# Patient Record
Sex: MALE | Race: WHITE | NOT HISPANIC OR LATINO | Employment: OTHER | URBAN - METROPOLITAN AREA
[De-identification: names, ages, dates, MRNs, and addresses within clinical notes are randomized per-mention and may not be internally consistent; named-entity substitution may affect disease eponyms.]

---

## 2017-08-31 ENCOUNTER — TELEPHONE (OUTPATIENT)
Dept: PAIN MEDICINE | Facility: CLINIC | Age: 67
End: 2017-08-31

## 2017-09-11 ENCOUNTER — HOSPITAL ENCOUNTER (OUTPATIENT)
Dept: GENERAL RADIOLOGY | Facility: HOSPITAL | Age: 67
Discharge: HOME | End: 2017-09-11

## 2017-09-11 ENCOUNTER — TRANSCRIBE ORDERS (OUTPATIENT)
Dept: ADMINISTRATIVE | Facility: HOSPITAL | Age: 67
End: 2017-09-11

## 2017-09-11 ENCOUNTER — HOSPITAL ENCOUNTER (OUTPATIENT)
Dept: CT IMAGING | Facility: HOSPITAL | Age: 67
Discharge: HOME | End: 2017-09-11
Admitting: INTERNAL MEDICINE

## 2017-09-11 DIAGNOSIS — R05.9 COUGH: ICD-10-CM

## 2017-09-11 DIAGNOSIS — R05.9 COUGH: Primary | ICD-10-CM

## 2017-09-11 DIAGNOSIS — K57.92 ACUTE DIVERTICULITIS: ICD-10-CM

## 2017-09-11 DIAGNOSIS — K57.92 ACUTE DIVERTICULITIS: Primary | ICD-10-CM

## 2017-09-11 PROCEDURE — 74176 CT ABD & PELVIS W/O CONTRAST: CPT

## 2017-09-11 PROCEDURE — 71020 HC CHEST PA AND LATERAL: CPT

## 2017-09-21 ENCOUNTER — TRANSCRIBE ORDERS (OUTPATIENT)
Dept: ADMINISTRATIVE | Facility: HOSPITAL | Age: 67
End: 2017-09-21

## 2017-09-21 DIAGNOSIS — K80.00 CALCULUS OF GALLBLADDER WITH ACUTE CHOLECYSTITIS WITHOUT OBSTRUCTION: Primary | ICD-10-CM

## 2017-09-25 ENCOUNTER — HOSPITAL ENCOUNTER (OUTPATIENT)
Dept: ULTRASOUND IMAGING | Facility: HOSPITAL | Age: 67
Discharge: HOME | End: 2017-09-25
Admitting: INTERNAL MEDICINE

## 2017-09-25 DIAGNOSIS — K80.00 CALCULUS OF GALLBLADDER WITH ACUTE CHOLECYSTITIS WITHOUT OBSTRUCTION: ICD-10-CM

## 2017-09-25 PROCEDURE — 76705 ECHO EXAM OF ABDOMEN: CPT

## 2017-11-16 ENCOUNTER — LAB REQUISITION (OUTPATIENT)
Dept: LAB | Facility: HOSPITAL | Age: 67
End: 2017-11-16

## 2017-11-16 DIAGNOSIS — K80.20 CALCULUS OF GALLBLADDER WITHOUT CHOLECYSTITIS WITHOUT OBSTRUCTION: ICD-10-CM

## 2017-11-16 PROCEDURE — 88304 TISSUE EXAM BY PATHOLOGIST: CPT | Performed by: SURGERY

## 2017-11-19 LAB
CYTO UR: NORMAL
DX PRELIMINARY: NORMAL
LAB AP CASE REPORT: NORMAL
LAB AP CLINICAL INFORMATION: NORMAL
Lab: NORMAL
PATH REPORT.GROSS SPEC: NORMAL

## 2018-06-05 ENCOUNTER — TRANSCRIBE ORDERS (OUTPATIENT)
Dept: ADMINISTRATIVE | Facility: HOSPITAL | Age: 68
End: 2018-06-05

## 2018-06-05 DIAGNOSIS — R55 NEAR SYNCOPE: ICD-10-CM

## 2018-06-05 DIAGNOSIS — R07.9 CHEST PAIN, UNSPECIFIED TYPE: Primary | ICD-10-CM

## 2018-06-21 ENCOUNTER — APPOINTMENT (OUTPATIENT)
Dept: CARDIOLOGY | Facility: HOSPITAL | Age: 68
End: 2018-06-21

## 2018-07-02 ENCOUNTER — HOSPITAL ENCOUNTER (OUTPATIENT)
Dept: CARDIOLOGY | Facility: HOSPITAL | Age: 68
Discharge: HOME | End: 2018-07-02

## 2018-07-02 VITALS
HEIGHT: 69 IN | DIASTOLIC BLOOD PRESSURE: 82 MMHG | HEART RATE: 64 BPM | BODY MASS INDEX: 31.7 KG/M2 | SYSTOLIC BLOOD PRESSURE: 126 MMHG | WEIGHT: 214 LBS

## 2018-07-02 DIAGNOSIS — R07.9 CHEST PAIN, UNSPECIFIED TYPE: ICD-10-CM

## 2018-07-02 DIAGNOSIS — R55 NEAR SYNCOPE: ICD-10-CM

## 2018-07-02 LAB
BH CV NUCLEAR PRIOR STUDY: 3
BH CV STRESS BP STAGE 3: NORMAL
BH CV STRESS BP STAGE 4: NORMAL
BH CV STRESS COMMENTS STAGE 1: NORMAL
BH CV STRESS DOSE REGADENOSON STAGE 1: 0.4
BH CV STRESS DURATION MIN STAGE 1: 1
BH CV STRESS DURATION MIN STAGE 2: 1
BH CV STRESS DURATION MIN STAGE 3: 1
BH CV STRESS DURATION MIN STAGE 4: 1
BH CV STRESS DURATION SEC STAGE 2: 0
BH CV STRESS HR STAGE 1: 61
BH CV STRESS HR STAGE 2: 82
BH CV STRESS HR STAGE 3: 91
BH CV STRESS HR STAGE 4: 68
BH CV STRESS PROTOCOL 1: NORMAL
BH CV STRESS RECOVERY BP: NORMAL MMHG
BH CV STRESS RECOVERY HR: 63 BPM
BH CV STRESS STAGE 1: 1
BH CV STRESS STAGE 2: 2
BH CV STRESS STAGE 3: 3
BH CV STRESS STAGE 4: 4
LV EF NUC BP: 59 %
MAXIMAL PREDICTED HEART RATE: 153 BPM
PERCENT MAX PREDICTED HR: 60.13 %
STRESS BASELINE BP: NORMAL MMHG
STRESS BASELINE HR: 56 BPM
STRESS PERCENT HR: 71 %
STRESS POST PEAK BP: NORMAL MMHG
STRESS POST PEAK HR: 92 BPM
STRESS TARGET HR: 130 BPM

## 2018-07-02 PROCEDURE — 93017 CV STRESS TEST TRACING ONLY: CPT

## 2018-07-02 PROCEDURE — 93018 CV STRESS TEST I&R ONLY: CPT | Performed by: INTERNAL MEDICINE

## 2018-07-02 PROCEDURE — A9500 TC99M SESTAMIBI: HCPCS | Performed by: INTERNAL MEDICINE

## 2018-07-02 PROCEDURE — 78452 HT MUSCLE IMAGE SPECT MULT: CPT

## 2018-07-02 PROCEDURE — 0 TECHNETIUM SESTAMIBI: Performed by: INTERNAL MEDICINE

## 2018-07-02 PROCEDURE — 78452 HT MUSCLE IMAGE SPECT MULT: CPT | Performed by: INTERNAL MEDICINE

## 2018-07-02 PROCEDURE — 25010000002 REGADENOSON 0.4 MG/5ML SOLUTION: Performed by: INTERNAL MEDICINE

## 2018-07-02 RX ORDER — SULINDAC 200 MG/1
200 TABLET ORAL 2 TIMES DAILY
COMMUNITY
End: 2019-11-20 | Stop reason: HOSPADM

## 2018-07-02 RX ADMIN — REGADENOSON 0.4 MG: 0.08 INJECTION, SOLUTION INTRAVENOUS at 09:26

## 2018-07-02 RX ADMIN — TECHNETIUM TC 99M SESTAMIBI 1 DOSE: 1 INJECTION INTRAVENOUS at 07:50

## 2018-07-02 RX ADMIN — TECHNETIUM TC 99M SESTAMIBI 1 DOSE: 1 INJECTION INTRAVENOUS at 09:25

## 2019-06-07 RX ORDER — DULOXETIN HYDROCHLORIDE 60 MG/1
60 CAPSULE, DELAYED RELEASE ORAL DAILY
COMMUNITY
End: 2019-11-11

## 2019-06-07 RX ORDER — MONTELUKAST SODIUM 4 MG/1
1 TABLET, CHEWABLE ORAL 2 TIMES DAILY
COMMUNITY
End: 2019-11-11

## 2019-06-10 ENCOUNTER — OFFICE VISIT (OUTPATIENT)
Dept: NEUROSURGERY | Facility: CLINIC | Age: 69
End: 2019-06-10

## 2019-06-10 VITALS
SYSTOLIC BLOOD PRESSURE: 125 MMHG | RESPIRATION RATE: 17 BRPM | WEIGHT: 218.2 LBS | HEIGHT: 69 IN | DIASTOLIC BLOOD PRESSURE: 95 MMHG | BODY MASS INDEX: 32.32 KG/M2

## 2019-06-10 DIAGNOSIS — F32.A DEPRESSION, UNSPECIFIED DEPRESSION TYPE: ICD-10-CM

## 2019-06-10 DIAGNOSIS — Z72.0 TOBACCO ABUSE: ICD-10-CM

## 2019-06-10 DIAGNOSIS — M43.16 SPONDYLOLISTHESIS OF LUMBAR REGION: Primary | ICD-10-CM

## 2019-06-10 DIAGNOSIS — M51.36 DDD (DEGENERATIVE DISC DISEASE), LUMBAR: ICD-10-CM

## 2019-06-10 DIAGNOSIS — Z71.6 TOBACCO ABUSE COUNSELING: ICD-10-CM

## 2019-06-10 PROCEDURE — 99204 OFFICE O/P NEW MOD 45 MIN: CPT | Performed by: NEUROLOGICAL SURGERY

## 2019-06-10 NOTE — PATIENT INSTRUCTIONS
-    Steps to Quit Smoking  Smoking tobacco can be harmful to your health and can affect almost every organ in your body. Smoking puts you, and those around you, at risk for developing many serious chronic diseases. Quitting smoking is difficult, but it is one of the best things that you can do for your health. It is never too late to quit.  What are the benefits of quitting smoking?  When you quit smoking, you lower your risk of developing serious diseases and conditions, such as:  · Lung cancer or lung disease, such as COPD.  · Heart disease.  · Stroke.  · Heart attack.  · Infertility.  · Osteoporosis and bone fractures.    Additionally, symptoms such as coughing, wheezing, and shortness of breath may get better when you quit. You may also find that you get sick less often because your body is stronger at fighting off colds and infections. If you are pregnant, quitting smoking can help to reduce your chances of having a baby of low birth weight.  How do I get ready to quit?  When you decide to quit smoking, create a plan to make sure that you are successful. Before you quit:  · Pick a date to quit. Set a date within the next two weeks to give you time to prepare.  · Write down the reasons why you are quitting. Keep this list in places where you will see it often, such as on your bathroom mirror or in your car or wallet.  · Identify the people, places, things, and activities that make you want to smoke (triggers) and avoid them. Make sure to take these actions:  ? Throw away all cigarettes at home, at work, and in your car.  ? Throw away smoking accessories, such as ashtrays and lighters.  ? Clean your car and make sure to empty the ashtray.  ? Clean your home, including curtains and carpets.  · Tell your family, friends, and coworkers that you are quitting. Support from your loved ones can make quitting easier.  · Talk with your health care provider about your options for quitting smoking.  · Find out what  treatment options are covered by your health insurance.    What strategies can I use to quit smoking?  Talk with your healthcare provider about different strategies to quit smoking. Some strategies include:  · Quitting smoking altogether instead of gradually lessening how much you smoke over a period of time. Research shows that quitting “cold turkey” is more successful than gradually quitting.  · Attending in-person counseling to help you build problem-solving skills. You are more likely to have success in quitting if you attend several counseling sessions. Even short sessions of 10 minutes can be effective.  · Finding resources and support systems that can help you to quit smoking and remain smoke-free after you quit. These resources are most helpful when you use them often. They can include:  ? Online chats with a counselor.  ? Telephone quitlines.  ? Printed self-help materials.  ? Support groups or group counseling.  ? Text messaging programs.  ? Mobile phone applications.  · Taking medicines to help you quit smoking. (If you are pregnant or breastfeeding, talk with your health care provider first.) Some medicines contain nicotine and some do not. Both types of medicines help with cravings, but the medicines that include nicotine help to relieve withdrawal symptoms. Your health care provider may recommend:  ? Nicotine patches, gum, or lozenges.  ? Nicotine inhalers or sprays.  ? Non-nicotine medicine that is taken by mouth.    Talk with your health care provider about combining strategies, such as taking medicines while you are also receiving in-person counseling. Using these two strategies together makes you more likely to succeed in quitting than if you used either strategy on its own.  If you are pregnant or breastfeeding, talk with your health care provider about finding counseling or other support strategies to quit smoking. Do not take medicine to help you quit smoking unless told to do so by your health  care provider.  What things can I do to make it easier to quit?  Quitting smoking might feel overwhelming at first, but there is a lot that you can do to make it easier. Take these important actions:  · Reach out to your family and friends and ask that they support and encourage you during this time. Call telephone quitlines, reach out to support groups, or work with a counselor for support.  · Ask people who smoke to avoid smoking around you.  · Avoid places that trigger you to smoke, such as bars, parties, or smoke-break areas at work.  · Spend time around people who do not smoke.  · Lessen stress in your life, because stress can be a smoking trigger for some people. To lessen stress, try:  ? Exercising regularly.  ? Deep-breathing exercises.  ? Yoga.  ? Meditating.  ? Performing a body scan. This involves closing your eyes, scanning your body from head to toe, and noticing which parts of your body are particularly tense. Purposefully relax the muscles in those areas.  · Download or purchase mobile phone or tablet apps (applications) that can help you stick to your quit plan by providing reminders, tips, and encouragement. There are many free apps, such as QuitGuide from the CDC (Centers for Disease Control and Prevention). You can find other support for quitting smoking (smoking cessation) through smokefree.gov and other websites.    How will I feel when I quit smoking?  Within the first 24 hours of quitting smoking, you may start to feel some withdrawal symptoms. These symptoms are usually most noticeable 2-3 days after quitting, but they usually do not last beyond 2-3 weeks. Changes or symptoms that you might experience include:  · Mood swings.  · Restlessness, anxiety, or irritation.  · Difficulty concentrating.  · Dizziness.  · Strong cravings for sugary foods in addition to nicotine.  · Mild weight gain.  · Constipation.  · Nausea.  · Coughing or a sore throat.  · Changes in how your medicines work in your  body.  · A depressed mood.  · Difficulty sleeping (insomnia).    After the first 2-3 weeks of quitting, you may start to notice more positive results, such as:  · Improved sense of smell and taste.  · Decreased coughing and sore throat.  · Slower heart rate.  · Lower blood pressure.  · Clearer skin.  · The ability to breathe more easily.  · Fewer sick days.    Quitting smoking is very challenging for most people. Do not get discouraged if you are not successful the first time. Some people need to make many attempts to quit before they achieve long-term success. Do your best to stick to your quit plan, and talk with your health care provider if you have any questions or concerns.  This information is not intended to replace advice given to you by your health care provider. Make sure you discuss any questions you have with your health care provider.  Document Released: 12/12/2002 Document Revised: 07/24/2018 Document Reviewed: 05/03/2016  [employer] Interactive Patient Education © 2019 [employer]              Spinal Fusion, Adult  Spinal fusion is a procedure to make two or more of the bones in the spinal column (vertebrae) grow together (fuse). This procedure stops the vertebrae from moving and rubbing against each other. The goal of this procedure is to relieve pain and prevent deformity and weakening of the spine.  During a spinal fusion procedure, bone material (graft) is put in between the two vertebrae to help them fuse. Hardware such as rods, screws, metal plates, or cages can be inserted to stabilize the vertebrae while they heal.  This procedure is used to treat conditions, including:  · Spinal injury.  · Herniated disk.  · Abnormal curvatures of the spine, such as scoliosis or kyphosis.  · Infections or tumors in the spine.  · Spondylolisthesis. This is when one vertebra slips on top of another.  · Spinal stenosis. This is a narrowing of the spine.    Tell a health care provider about:  · Any allergies you  have.  · All medicines you are taking, including vitamins, herbs, eye drops, creams, and over-the-counter medicines.  · Any problems you or family members have had with anesthetic medicines.  · Any blood disorders you have.  · Any surgeries you have had.  · Any medical conditions you have.  · Whether you are pregnant or may be pregnant.  What are the risks?  Generally, this is a safe procedure. However, problems may occur, including:  · Infection.  · Bleeding.  · Allergic reactions to medicines or dyes.  · Damage to other structures or organs, such as nerves near the spine.  · Spinal fluid leakage.  · Blood clots.  · Trouble controlling urination or bowel movements.  · Pseudoarthrosis. This is when the vertebrae do not fuse together completely.    What happens before the procedure?  Staying hydrated  Follow instructions from your health care provider about hydration, which may include:  · Up to 2 hours before the procedure - you may continue to drink clear liquids, such as water, clear fruit juice, black coffee, and plain tea.    Eating and drinking restrictions  Follow instructions from your health care provider about eating and drinking, which may include:  · 8 hours before the procedure - stop eating heavy meals or foods such as meat, fried foods, or fatty foods.  · 6 hours before the procedure - stop eating light meals or foods, such as toast or cereal.  · 6 hours before the procedure - stop drinking milk or drinks that contain milk.  · 2 hours before the procedure - stop drinking clear liquids.    Medicines  · Ask your health care provider about:  ? Changing or stopping your regular medicines. This is especially important if you are taking diabetes medicines or blood thinners.  ? Taking medicines such as aspirin and ibuprofen. These medicines can thin your blood. Do not take these medicines before your procedure if your health care provider instructs you not to.  · You may be given antibiotic medicine to help  prevent infection.  General instructions  · Ask your health care provider how your surgical site will be marked or identified.  · You will have blood and urine samples taken.  · You may have imaging tests, such as:  ? X-rays.  ? CT scan.  ? MRI.  · Plan to have someone take you home from the hospital or clinic.  · If you will be going home right after the procedure, plan to have someone with you for 24 hours.  · Do not use any tobacco products, including cigarettes, chewing tobacco, or electronic cigarettes. If you need help quitting, ask your health care provider. Tobacco and nicotine products can delay healing after your surgery.  What happens during the procedure?  · To reduce your risk of infection:  ? Your health care team will wash or sanitize their hands.  ? Your skin will be washed with soap.  ? Hair may be removed from the surgical area.  · An IV tube will be inserted into one of your veins.  · You will be given one or more of the following:  ? A medicine to help you relax (sedative).  ? A medicine to make you fall asleep (general anesthetic).  · If bone from another part of your body (autogenous bone) is being used to fill the space between your vertebrae:  ? An incision will be made over the site of the bone graft. Often, the bone is taken from the hip (pelvic) bone.  ? A small part of the bone will be removed.  · An incision will be made over the vertebrae that will be fused. This incision may be on your back, abdomen, or side.  · The muscles will be moved aside so the surgeon can see the vertebrae.  · If you are having this procedure to treat a herniated disk, part of the disk will be removed.  · The space between the vertebrae will be filled with autogenous bone, bone from a bone donor (allograft bone), or artificial bone material.  · Screws and rods or metal plates may be used to stabilize the vertebrae while they fuse.  · The muscles will be moved back into place.  · A small tube (drain) may be  placed near one of the incisions to help drain extra fluid from your surgical site.  · Your incision(s) will be closed.  · A bandage (dressing) may be used to cover your incision(s).  The procedure may vary among health care providers and hospitals.  What happens after the procedure?  · You will be given medicine as needed for pain.  · You will continue to receive fluids and medicines through an IV tube.  · Your blood pressure, heart rate, breathing rate, and blood oxygen level will be monitored until the medicines you were given have worn off.  · You may be given a brace to wear while you heal.  · You may have to wear compression stockings. These stockings help to prevent blood clots and reduce swelling in your legs.  · You will be taught how to move correctly and how to stand and walk. While in bed, you will be instructed to turn frequently without twisting the back (log rolling technique).  · Do not drive for 24 hours if you received a sedative.  Summary  · Spinal fusion is a procedure to make two or more of the bones in the spinal column (vertebrae) grow together (fuse).  · Follow instructions from your healthcare provider about what to eat, what to drink, and what medicines to take.  · After surgery, you will be instructed to turn frequently without twisting the back (log rolling technique) while in bed. You will also be taught how to move correctly and how to stand and walk.  This information is not intended to replace advice given to you by your health care provider. Make sure you discuss any questions you have with your health care provider.  Document Released: 09/15/2004 Document Revised: 12/05/2017 Document Reviewed: 12/05/2017  [employer] Interactive Patient Education © 2019 [employer]

## 2019-06-10 NOTE — PROGRESS NOTES
NAME: NESTOR GAXIOLA   DOS: 6/10/2019  : 1950  PCP: Gallito Root MD    Chief Complaint:    Chief Complaint   Patient presents with   • Low back & bilateral leg pain-L side greater       History of Present Illness:  68 y.o. 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  He is about to retire.    He presents with neurogenic claudication he has back buttock hip and leg pain he denies any cauda equina syndrome he has not been through any early therapies for it and is here to discuss surgical options for repair he denies any other symptomatologies  PMHX  Allergies:  No Known Allergies  Medications    Current Outpatient Medications:   •  CANNABIDIOL PO, Take  by mouth., Disp: , Rfl:   •  colestipol (COLESTID) 1 g tablet, Take 1 g by mouth 2 (Two) Times a Day., Disp: , Rfl:   •  diclofenac (VOLTAREN) 1 % gel gel, Apply 4 g topically to the appropriate area as directed 4 (Four) Times a Day As Needed., Disp: , Rfl:   •  DULoxetine (CYMBALTA) 60 MG capsule, Take 60 mg by mouth Daily., Disp: , Rfl:   •  oxyCODONE-acetaminophen (PERCOCET) 5-325 MG per tablet, Take 1-2 tablets by mouth Every 4 (Four) Hours As Needed for pain., Disp: 30 tablet, Rfl: 0  •  promethazine (PHENERGAN) 25 MG tablet, Take 1 tablet by mouth Every 6 (Six) Hours As Needed for nausea., Disp: 10 tablet, Rfl: 0  •  sulindac (CLINORIL) 200 MG tablet, Take 200 mg by mouth 2 (Two) Times a Day., Disp: , Rfl:   Past Medical History:  Past Medical History:   Diagnosis Date   • Cancer (CMS/HCC)     Skin     Past Surgical History:  Past Surgical History:   Procedure Laterality Date   • KNEE ARTHROPLASTY, PARTIAL REPLACEMENT Bilateral      Social Hx:  Social History     Tobacco Use   • Smoking status: Current Every Day Smoker   • Smokeless  tobacco: Never Used   Substance Use Topics   • Alcohol use: Yes   • Drug use: Yes     Types: Marijuana     Family Hx:  History reviewed. No pertinent family history.  Review of Systems:        Review of Systems   Constitutional: Negative for activity change, appetite change, chills, diaphoresis, fatigue, fever and unexpected weight change.   HENT: Negative for congestion, dental problem, drooling, ear discharge, ear pain, facial swelling, hearing loss, mouth sores, nosebleeds, postnasal drip, rhinorrhea, sinus pressure, sneezing, sore throat, tinnitus, trouble swallowing and voice change.    Eyes: Positive for redness. Negative for photophobia, pain, discharge, itching and visual disturbance.   Respiratory: Negative for apnea, cough, choking, chest tightness, shortness of breath, wheezing and stridor.    Cardiovascular: Negative for chest pain, palpitations and leg swelling.   Gastrointestinal: Positive for anal bleeding. Negative for abdominal distention, abdominal pain, blood in stool, constipation, diarrhea, nausea, rectal pain and vomiting.   Endocrine: Negative for cold intolerance, heat intolerance, polydipsia, polyphagia and polyuria.   Genitourinary: Negative for decreased urine volume, difficulty urinating, dysuria, enuresis, flank pain, frequency, genital sores, hematuria and urgency.   Musculoskeletal: Positive for arthralgias, back pain, myalgias, neck pain and neck stiffness. Negative for gait problem and joint swelling.   Skin: Negative for color change, pallor, rash and wound.   Allergic/Immunologic: Negative for environmental allergies, food allergies and immunocompromised state.   Neurological: Negative for dizziness, tremors, seizures, syncope, facial asymmetry, speech difficulty, weakness, light-headedness, numbness and headaches.   Hematological: Negative for adenopathy. Does not bruise/bleed easily.   Psychiatric/Behavioral: Negative for agitation, behavioral problems, confusion, decreased  concentration, dysphoric mood, hallucinations, self-injury, sleep disturbance and suicidal ideas. The patient is not nervous/anxious and is not hyperactive.    All other systems reviewed and are negative.     I have reviewed this note template and all pertinent parts of the review of systems social, family history, surgical history and medication list      Physical Examination:  Vitals:    06/10/19 0927   BP: 125/95   Resp: 17      General Appearance:   Well developed, well nourished, well groomed, alert, and cooperative.  Neurological examination:  Neurologic Exam  Vital signs were reviewed and documented in the chart  Patient appeared in good neurologic function with normal comprehension fluent speech  Mood and affect are normal  Sense of smell deferred    Pupils symmetric equally reactive funduscopic exam not visualized   Visual fields intact to confrontation  Extraocular movements intact  Face motor function is symmetric  Facial sensations normal  Hearing intact to finger rub hearing intact to finger rub  Tongue is midline  Palate symmetric  Swallowing normal  Shoulder shrug normal  He has a smoker's cough  Muscle bulk and tone normal  5 out of 5 strength no motor drift  Gait normal intact  Negative Romberg  No clonus long tract signs or myelopathy    Reflexes symmetric  No edema noted and extremities skin appears normal    Straight leg raise sign absent  No signs of intrinsic hip dysfunction he does have decreased range of motion  Back is without any lesions or abnormality  Feet are warm and well perfused        Review of Imaging/DATA:  I reviewed his MRI is got a grade 1 spondylolisthesis at L4 on 5  Diagnoses/Plan:    Mr. Pablo is a 68 y.o. male     I had about a 45-minute discussion with this gentleman.  Very interesting.  I explained to him the fusion the options the treatment for the neurogenic claudication as well as try to set appropriate expectations regarding outcome from surgery.  When I broach  the subject of smoking cessation we had a long discussion about this and he stated that he was not ready to to stop smoking.  I explained to him that the detrimental effects on outcomes regarding back surgery and fusion and specifics are not low and he unpacked a lot of his social stressors that caused him to continue to smoke.  I made a deal with him that I think it is not unreasonable to consider surgery in a smoker as long as he gets medical clearance I like him to see a pulmonologist to check his lung function but more importantly I think he needs to talk to somebody about his social stressors that are causing him to maintain this unhealthy activity.  He related to me in addiction history that included cocaine use in the past as well as relational issues currently.    I think surgery will likely help him as long as it placed in the proper context.  He may be a candidate for an L4-5 lumbar interbody fusion I explained the risk benefits and options to him.  He is can see me back

## 2019-06-12 ENCOUNTER — TELEPHONE (OUTPATIENT)
Dept: NEUROSURGERY | Facility: CLINIC | Age: 69
End: 2019-06-12

## 2019-06-12 NOTE — TELEPHONE ENCOUNTER
Pt called in today to say that he was unhappy with the visit he had with Delonte with this week and would like to request a 2nd opinion with Dr. Santa.  Please let me know if it is ok with the providers to make this change.

## 2019-07-02 ENCOUNTER — OFFICE VISIT (OUTPATIENT)
Dept: NEUROSURGERY | Facility: CLINIC | Age: 69
End: 2019-07-02

## 2019-07-02 VITALS — WEIGHT: 218 LBS | TEMPERATURE: 97.2 F | BODY MASS INDEX: 32.29 KG/M2 | HEIGHT: 69 IN

## 2019-07-02 DIAGNOSIS — M47.816 FACET ARTHROPATHY, LUMBAR: ICD-10-CM

## 2019-07-02 DIAGNOSIS — M43.16 SPONDYLOLISTHESIS OF LUMBAR REGION: ICD-10-CM

## 2019-07-02 DIAGNOSIS — M51.36 DDD (DEGENERATIVE DISC DISEASE), LUMBAR: ICD-10-CM

## 2019-07-02 DIAGNOSIS — M48.062 SPINAL STENOSIS, LUMBAR REGION, WITH NEUROGENIC CLAUDICATION: Primary | ICD-10-CM

## 2019-07-02 PROCEDURE — 99213 OFFICE O/P EST LOW 20 MIN: CPT | Performed by: NEUROLOGICAL SURGERY

## 2019-07-02 NOTE — PROGRESS NOTES
Patient: Johnnie Pablo  : 1950    Primary Care Provider: Gallito Root MD    Requesting Provider: As above        History    Chief Complaint: Bilateral hip pain with numbness and burning in the buttocks and legs.    History of Present Illness: Mr. 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  He has had chronic back difficulties that have been treated over the years with injections.  His symptoms have been worse over the last year and less responsive to injections.  Remotely he did therapy.  Sometimes the pain comes and goes but it is worse with walking.  He has mild low back pain.  He complains of a baseline numbness in his feet that he attributes to neuropathy.  He takes an occasional half of a Percocet to be able to play a round of golf but then he pays for it pain-wise later.  He has no bowel or bladder dysfunction.  He has recently seen Dr. Martel who discussed surgical intervention and smoking cessation.    Review of Systems   Constitutional: Negative for activity change, appetite change, chills, diaphoresis, fatigue, fever and unexpected weight change.   HENT: Negative for congestion, dental problem, drooling, ear discharge, ear pain, facial swelling, hearing loss, mouth sores, nosebleeds, postnasal drip, rhinorrhea, sinus pressure, sneezing, sore throat, tinnitus, trouble swallowing and voice change.    Eyes: Positive for redness. Negative for photophobia, pain, discharge, itching and visual disturbance.   Respiratory: Negative for apnea, cough, choking, chest tightness, shortness of breath, wheezing and stridor.    Cardiovascular: Negative for chest pain, palpitations and leg swelling.   Gastrointestinal: Positive for anal bleeding. Negative for abdominal distention, abdominal pain, blood in stool, constipation, diarrhea, nausea, rectal pain and vomiting.   Endocrine: Negative for cold intolerance, heat intolerance,  "polydipsia, polyphagia and polyuria.   Genitourinary: Negative for decreased urine volume, difficulty urinating, dysuria, enuresis, flank pain, frequency, genital sores, hematuria and urgency.   Musculoskeletal: Positive for arthralgias, back pain, myalgias, neck pain and neck stiffness. Negative for gait problem and joint swelling.   Skin: Negative for color change, pallor, rash and wound.   Allergic/Immunologic: Negative for environmental allergies, food allergies and immunocompromised state.   Neurological: Negative for dizziness, tremors, seizures, syncope, facial asymmetry, speech difficulty, weakness, light-headedness, numbness and headaches.   Hematological: Negative for adenopathy. Does not bruise/bleed easily.   Psychiatric/Behavioral: Negative for agitation, behavioral problems, confusion, decreased concentration, dysphoric mood, hallucinations, self-injury, sleep disturbance and suicidal ideas. The patient is not nervous/anxious and is not hyperactive.    All other systems reviewed and are negative.      The patient's past medical history, past surgical history, family history, and social history have been reviewed at length in the electronic medical record.    Physical Exam:   Temp 97.2 °F (36.2 °C)   Ht 175 cm (68.9\")   Wt 98.9 kg (218 lb)   BMI 32.29 kg/m²   MUSCULOSKELETAL:  Straight leg raising is negative.  Rosales's Sign is negative.  ROM in back normal.  Tenderness in the back to palpation is not observed.  NEUROLOGICAL:  Strength is intact in the lower extremities to direct testing.  Muscle tone is normal throughout.  Station and gait are normal.  Sensation is intact to light touch testing throughout except in the feet where it is diminished bilaterally.  Deep tendon reflexes are difficult to elicit throughout.  Coordination is intact.      Medical Decision Making    Data Review:   MRI of the lumbar spine dated 5/6/2019 demonstrates some diffuse degenerative disc disease.  There is a generous " grade 1 listhesis of L4 on L5 with a fair amount of spinal stenosis, right greater than left.  There is a right-sided joint effusion.    Diagnosis:   1.  L4-5 spondylolisthesis with probable instability.  2.  Lumbar stenosis with neurogenic claudication.  3.  Lumbar degenerative disc disease.  4.  Tobacco abuse.    Treatment Options:   Definitive treatment would entail L4-5 decompression is fusion as noted by Dr. Martel.  I too harbor significant reservations given his heavy tobacco use.  If he could cut down substantially then I think it would be reasonable to pursue surgery.  After lengthy discussion he is going to work on cutting down his smoking.  He is thinking that he would like to do surgery in October or November.  As such I will see him back in September and if he has been successful in reducing his smoking then we will set up the surgical intervention.  At length I have discussed the nature of the surgery as well as the potential risks, complications, and limitations of the procedure.       Diagnosis Plan   1. Spinal stenosis, lumbar region, with neurogenic claudication     2. Spondylolisthesis of lumbar region     3. DDD (degenerative disc disease), lumbar     4. Facet arthropathy, lumbar             I, Dr. Santa, personally performed the services described in the documentation, as scribed in my presence, and it is both accurate and complete.  Scribed for Farhat Santa MD by Manoj Jara CMA on 07/02/2019 at 8:53 AM

## 2019-09-26 ENCOUNTER — TELEPHONE (OUTPATIENT)
Dept: NEUROSURGERY | Facility: CLINIC | Age: 69
End: 2019-09-26

## 2019-09-26 NOTE — TELEPHONE ENCOUNTER
He needs to substantially cut back on his smoking prior to being considered for surgery. He can look elsewhere if can't stop smoking

## 2019-10-03 ENCOUNTER — TELEPHONE (OUTPATIENT)
Dept: NEUROSURGERY | Facility: CLINIC | Age: 69
End: 2019-10-03

## 2019-10-03 NOTE — TELEPHONE ENCOUNTER
Provider:  Kiet  Caller: patient  Time of call:   10:22  Phone #:  [phone]  Surgery:    Surgery Date:    Last visit:   07/02/19  Next visit: None    CHLOE:         Reason for call:     Patient wants Dr. Santa to know that he is down to 1/2 pack of cig's qd.  He has been vaping.  He states that he is ready to have his surgery scheduled.

## 2019-10-03 NOTE — TELEPHONE ENCOUNTER
I will discuss this with Dr. Santa when he returns from his trip.  If he agrees, then we can move forward with surgery

## 2019-10-11 NOTE — TELEPHONE ENCOUNTER
I spoke with Dr. Santa regarding surgery.   - he would like to see him back in the office to discuss smoking cessation and other surgical details   - of note, he may consider using BMP.

## 2019-10-23 ENCOUNTER — PREP FOR SURGERY (OUTPATIENT)
Dept: OTHER | Facility: HOSPITAL | Age: 69
End: 2019-10-23

## 2019-10-23 ENCOUNTER — OFFICE VISIT (OUTPATIENT)
Dept: NEUROSURGERY | Facility: CLINIC | Age: 69
End: 2019-10-23

## 2019-10-23 VITALS — BODY MASS INDEX: 32.08 KG/M2 | RESPIRATION RATE: 16 BRPM | WEIGHT: 216.6 LBS | HEIGHT: 69 IN

## 2019-10-23 DIAGNOSIS — M43.16 SPONDYLOLISTHESIS OF LUMBAR REGION: ICD-10-CM

## 2019-10-23 DIAGNOSIS — M51.36 DDD (DEGENERATIVE DISC DISEASE), LUMBAR: ICD-10-CM

## 2019-10-23 DIAGNOSIS — Z72.0 TOBACCO ABUSE: ICD-10-CM

## 2019-10-23 DIAGNOSIS — M48.062 LUMBAR STENOSIS WITH NEUROGENIC CLAUDICATION: Primary | ICD-10-CM

## 2019-10-23 DIAGNOSIS — M48.062 SPINAL STENOSIS, LUMBAR REGION, WITH NEUROGENIC CLAUDICATION: Primary | ICD-10-CM

## 2019-10-23 PROCEDURE — 99213 OFFICE O/P EST LOW 20 MIN: CPT | Performed by: NEUROLOGICAL SURGERY

## 2019-10-23 RX ORDER — ACETAMINOPHEN 500 MG
1000 TABLET ORAL ONCE
Status: CANCELLED | OUTPATIENT
Start: 2019-10-23 | End: 2019-10-23

## 2019-10-23 RX ORDER — CEFAZOLIN SODIUM 2 G/100ML
2 INJECTION, SOLUTION INTRAVENOUS ONCE
Status: CANCELLED | OUTPATIENT
Start: 2019-10-23 | End: 2019-10-23

## 2019-10-23 RX ORDER — OXYCODONE HCL 10 MG/1
10 TABLET, FILM COATED, EXTENDED RELEASE ORAL ONCE
Status: CANCELLED | OUTPATIENT
Start: 2019-10-23 | End: 2019-10-23

## 2019-10-23 RX ORDER — FAMOTIDINE 20 MG/1
20 TABLET, FILM COATED ORAL
Status: CANCELLED | OUTPATIENT
Start: 2019-10-23

## 2019-10-23 RX ORDER — IBUPROFEN 800 MG/1
800 TABLET ORAL ONCE
Status: CANCELLED | OUTPATIENT
Start: 2019-10-23 | End: 2019-10-23

## 2019-10-23 NOTE — PROGRESS NOTES
Patient: Johnnie Pablo  : 1950    Primary Care Provider: Glalito Root MD    Requesting Provider: As above        History    Chief Complaint: Low back and bilateral hip pain with numbness and burning in the buttocks and legs.    History of Present Illness: Mr. Pablo is a 68-year-old gentleman who works in [employer]  He has had chronic back difficulties that have been treated over the years with injections.  His symptoms have been worse over the last year and less responsive to injections.  Remotely he did therapy.  Sometimes the pain comes and goes but it is worse with walking.  He has mild low back pain.  He complains of a baseline numbness in his feet that he attributes to neuropathy.  He takes an occasional half of a Percocet to be able to play a round of golf but then he pays for it pain-wise later.  He has no bowel or bladder dysfunction.  He has recently seen Dr. Martel who discussed surgical intervention and smoking cessation.  I also discussed surgical intervention with him but also required smoking cessation.  He recently had an injection in his back by Dr. Aguero and his symptoms have subsided a bit but are slowly returning.  He has cut his smoking down to about 2 cigarettes a day or so.  He is now vaping.    Review of Systems   Constitutional: Negative for activity change, appetite change, chills, diaphoresis, fatigue, fever and unexpected weight change.   HENT: Negative for congestion, dental problem, drooling, ear discharge, ear pain, facial swelling, hearing loss, mouth sores, nosebleeds, postnasal drip, rhinorrhea, sinus pressure, sneezing, sore throat, tinnitus, trouble swallowing and voice change.    Eyes: Negative for photophobia, pain, discharge, redness, itching and visual disturbance.   Respiratory: Negative for apnea, cough, choking, chest tightness, shortness of breath, wheezing and stridor.    Cardiovascular: Negative for chest pain, palpitations and leg  "swelling.   Gastrointestinal: Negative for abdominal distention, abdominal pain, anal bleeding, blood in stool, constipation, diarrhea, nausea, rectal pain and vomiting.   Endocrine: Negative for cold intolerance, heat intolerance, polydipsia, polyphagia and polyuria.   Genitourinary: Negative for decreased urine volume, difficulty urinating, dysuria, enuresis, flank pain, frequency, genital sores, hematuria and urgency.   Musculoskeletal: Negative for arthralgias, back pain, gait problem, joint swelling, myalgias, neck pain and neck stiffness.   Skin: Negative for color change, pallor, rash and wound.   Allergic/Immunologic: Negative for environmental allergies, food allergies and immunocompromised state.   Neurological: Negative for dizziness, tremors, seizures, syncope, facial asymmetry, speech difficulty, weakness, light-headedness, numbness and headaches.   Hematological: Negative for adenopathy. Does not bruise/bleed easily.   Psychiatric/Behavioral: Negative for agitation, behavioral problems, confusion, decreased concentration, dysphoric mood, hallucinations, self-injury, sleep disturbance and suicidal ideas. The patient is not nervous/anxious and is not hyperactive.    All other systems reviewed and are negative.      The patient's past medical history, past surgical history, family history, and social history have been reviewed at length in the electronic medical record.    Physical Exam:   Resp 16   Ht 175 cm (68.9\")   Wt 98.2 kg (216 lb 9.6 oz)   BMI 32.08 kg/m²   MUSCULOSKELETAL:  Straight leg raising is negative.  Rosales's Sign is negative.  ROM in back normal.  Tenderness in the back to palpation is not observed.  NEUROLOGICAL:  Strength is intact in the lower extremities to direct testing.  Muscle tone is normal throughout.  Station and gait are normal.  Sensation is intact to light touch testing throughout.      Medical Decision Making    Data Review:   MRI of the lumbar spine dated 5/6/2019 " demonstrates some diffuse degenerative disc disease.  There is a generous grade 1 listhesis of L4 on L5 with a fair amount of spinal stenosis, right greater than left.  There is a right-sided joint effusion.    Diagnosis:   1.  Lumbar stenosis with neurogenic claudication.  2.  L4-5 spondylolisthesis with instability.  3.  Lumbar degenerative disc disease.  4.  Tobacco abuse, dramatically improved.    Treatment Options:   I have recommended L4-5 decompression, fusion, stabilization.  I think this will help significantly with his symptoms but it is unlikely to completely eradicate them given the degenerative change in his back.  Some of his pain is certainly mechanical in nature.  The nature of the procedure as well as the potential risks, complications, limitations, and alternatives to the procedure were discussed at length with the patient and the patient has agreed to proceed with surgery.  If possible I would like to utilize BMP to facilitate fusion.  I explained to him the rationale for doing so and that this would be an off label use.       Diagnosis Plan   1. Spinal stenosis, lumbar region, with neurogenic claudication     2. Spondylolisthesis of lumbar region     3. DDD (degenerative disc disease), lumbar     4. Tobacco abuse         Scribed for Farhat Santa MD by Angy Orellana CMA on 10/23/2019 12:20 PM       I, Dr. Santa, personally performed the services described in the documentation, as scribed in my presence, and it is both accurate and complete.

## 2019-10-23 NOTE — H&P
Patient: Johnnie Pablo  : 1950     Primary Care Provider: Gallito Root MD     Requesting Provider: As above           History     Chief Complaint: Low back and bilateral hip pain with numbness and burning in the buttocks and legs.     History of Present Illness: Mr. Pablo is a 68-year-old gentleman who works in [employer]  He has had chronic back difficulties that have been treated over the years with injections.  His symptoms have been worse over the last year and less responsive to injections.  Remotely he did therapy.  Sometimes the pain comes and goes but it is worse with walking.  He has mild low back pain.  He complains of a baseline numbness in his feet that he attributes to neuropathy.  He takes an occasional half of a Percocet to be able to play a round of golf but then he pays for it pain-wise later.  He has no bowel or bladder dysfunction.  He has recently seen Dr. Martel who discussed surgical intervention and smoking cessation.  I also discussed surgical intervention with him but also required smoking cessation.  He recently had an injection in his back by Dr. Aguero and his symptoms have subsided a bit but are slowly returning.  He has cut his smoking down to about 2 cigarettes a day or so.  He is now vaping.     Review of Systems   Constitutional: Negative for activity change, appetite change, chills, diaphoresis, fatigue, fever and unexpected weight change.   HENT: Negative for congestion, dental problem, drooling, ear discharge, ear pain, facial swelling, hearing loss, mouth sores, nosebleeds, postnasal drip, rhinorrhea, sinus pressure, sneezing, sore throat, tinnitus, trouble swallowing and voice change.    Eyes: Negative for photophobia, pain, discharge, redness, itching and visual disturbance.   Respiratory: Negative for apnea, cough, choking, chest tightness, shortness of breath, wheezing and stridor.    Cardiovascular: Negative for chest pain, palpitations and leg  swelling.   Gastrointestinal: Negative for abdominal distention, abdominal pain, anal bleeding, blood in stool, constipation, diarrhea, nausea, rectal pain and vomiting.   Endocrine: Negative for cold intolerance, heat intolerance, polydipsia, polyphagia and polyuria.   Genitourinary: Negative for decreased urine volume, difficulty urinating, dysuria, enuresis, flank pain, frequency, genital sores, hematuria and urgency.   Musculoskeletal: Negative for arthralgias, back pain, gait problem, joint swelling, myalgias, neck pain and neck stiffness.   Skin: Negative for color change, pallor, rash and wound.   Allergic/Immunologic: Negative for environmental allergies, food allergies and immunocompromised state.   Neurological: Negative for dizziness, tremors, seizures, syncope, facial asymmetry, speech difficulty, weakness, light-headedness, numbness and headaches.   Hematological: Negative for adenopathy. Does not bruise/bleed easily.   Psychiatric/Behavioral: Negative for agitation, behavioral problems, confusion, decreased concentration, dysphoric mood, hallucinations, self-injury, sleep disturbance and suicidal ideas. The patient is not nervous/anxious and is not hyperactive.    All other systems reviewed and are negative.        The patient's past medical history, past surgical history, family history, and social history have been reviewed at length in the electronic medical record.     Past Medical History:   Diagnosis Date   • Cancer (CMS/HCC)     Skin     Past Surgical History:   Procedure Laterality Date   • KNEE ARTHROPLASTY, PARTIAL REPLACEMENT Bilateral      No family history on file.  Social History     Socioeconomic History   • Marital status:      Spouse name: Not on file   • Number of children: Not on file   • Years of education: Not on file   • Highest education level: Not on file   Tobacco Use   • Smoking status: Current Every Day Smoker     Packs/day: 0.50     Types: Cigarettes, Electronic  "Cigarette   • Smokeless tobacco: Never Used   Substance and Sexual Activity   • Alcohol use: Yes   • Drug use: Yes     Types: Marijuana   • Sexual activity: Defer     No Known Allergies    Physical Exam:   Resp 16   Ht 175 cm (68.9\")   Wt 98.2 kg (216 lb 9.6 oz)   BMI 32.08 kg/m²   MUSCULOSKELETAL:  Straight leg raising is negative.  Rosales's Sign is negative.  ROM in back normal.  Tenderness in the back to palpation is not observed.  NEUROLOGICAL:  Strength is intact in the lower extremities to direct testing.  Muscle tone is normal throughout.  Station and gait are normal.  Sensation is intact to light touch testing throughout.        Medical Decision Making     Data Review:   MRI of the lumbar spine dated 5/6/2019 demonstrates some diffuse degenerative disc disease.  There is a generous grade 1 listhesis of L4 on L5 with a fair amount of spinal stenosis, right greater than left.  There is a right-sided joint effusion.     Diagnosis:   1.  Lumbar stenosis with neurogenic claudication.  2.  L4-5 spondylolisthesis with instability.  3.  Lumbar degenerative disc disease.  4.  Tobacco abuse, dramatically improved.     Treatment Options:   I have recommended L4-5 decompression, fusion, stabilization.  I think this will help significantly with his symptoms but it is unlikely to completely eradicate them given the degenerative change in his back.  Some of his pain is certainly mechanical in nature.  The nature of the procedure as well as the potential risks, complications, limitations, and alternatives to the procedure were discussed at length with the patient and the patient has agreed to proceed with surgery.  If possible I would like to utilize BMP to facilitate fusion.  I explained to him the rationale for doing so and that this would be an off label use.          Diagnosis Plan   1. Spinal stenosis, lumbar region, with neurogenic claudication      2. Spondylolisthesis of lumbar region      3. DDD (degenerative " disc disease), lumbar      4. Tobacco abuse

## 2019-11-11 ENCOUNTER — APPOINTMENT (OUTPATIENT)
Dept: PREADMISSION TESTING | Facility: HOSPITAL | Age: 69
End: 2019-11-11

## 2019-11-11 VITALS — HEIGHT: 69 IN | WEIGHT: 216.05 LBS | BODY MASS INDEX: 32 KG/M2

## 2019-11-11 DIAGNOSIS — M48.062 LUMBAR STENOSIS WITH NEUROGENIC CLAUDICATION: ICD-10-CM

## 2019-11-11 LAB
DEPRECATED RDW RBC AUTO: 45.9 FL (ref 37–54)
ERYTHROCYTE [DISTWIDTH] IN BLOOD BY AUTOMATED COUNT: 13.8 % (ref 12.3–15.4)
HBA1C MFR BLD: 5.4 % (ref 4.8–5.6)
HCT VFR BLD AUTO: 49.3 % (ref 37.5–51)
HGB BLD-MCNC: 15.2 G/DL (ref 13–17.7)
MCH RBC QN AUTO: 27.7 PG (ref 26.6–33)
MCHC RBC AUTO-ENTMCNC: 30.8 G/DL (ref 31.5–35.7)
MCV RBC AUTO: 90 FL (ref 79–97)
PLATELET # BLD AUTO: 142 10*3/MM3 (ref 140–450)
PMV BLD AUTO: 11.8 FL (ref 6–12)
RBC # BLD AUTO: 5.48 10*6/MM3 (ref 4.14–5.8)
WBC NRBC COR # BLD: 8.18 10*3/MM3 (ref 3.4–10.8)

## 2019-11-11 PROCEDURE — 87081 CULTURE SCREEN ONLY: CPT | Performed by: NEUROLOGICAL SURGERY

## 2019-11-11 PROCEDURE — 85027 COMPLETE CBC AUTOMATED: CPT | Performed by: ANESTHESIOLOGY

## 2019-11-11 PROCEDURE — 36415 COLL VENOUS BLD VENIPUNCTURE: CPT

## 2019-11-11 PROCEDURE — 83036 HEMOGLOBIN GLYCOSYLATED A1C: CPT | Performed by: ANESTHESIOLOGY

## 2019-11-11 RX ORDER — FAMOTIDINE 20 MG/1
20 TABLET, FILM COATED ORAL 2 TIMES DAILY PRN
COMMUNITY

## 2019-11-11 NOTE — DISCHARGE INSTRUCTIONS
The following information and instructions were given:    Do not eat, drink, smoke or chew gum after midnight the night before surgery. This also includes no mints.  Take all routine, prescribed medications including heart and blood pressure medicines with a sip of water unless otherwise instructed by your physician.   Do NOT take diabetic medication unless instructed by your physician.    If you were instructed to drink 20 ounces (or until full) of Gatorade the morning of surgery, the Gatorade must be completed 1 hour before arrival time on the day of surgery .  No RED Gatorade.      DO NOT shave for two days before your procedure.  Do not wear makeup.      DO NOT wear fingernail polish (gel/regular) and/or acrylic/artificial nails on the day of surgery.   If you have had a recent manicure and would rather not remove polish or artificial nails, then the minimum requirement is that the polish/artificial nails must be removed from the middle finger on each hand.      If you are having surgery/procedure on an upper extremity, then fingernail polish/artificial fingernails must be removed for surgery.  NO EXCEPTIONS.      If you are having surgery/procedure on a lower extremity, then toenail polish on both extremities must be removed for surgery.  NO EXCEPTIONS.    Remove all jewelry (advise to go to jeweler if unable to remove).  Jewelry, especially rings, can no longer be taped for surgery.    Leave anything you consider valuable at home.    Leave your suitcase in the car until after your surgery.    Bring the following with you the day of your procedure (when applicable)       -picture ID and insurance cards       -Co-pay/deductible required by insurance       -Medications in the original bottles (not a list) including all over-the-counter medications if not brought to PAT       -Copy of advance directive, living will or power of  documents if not brought to PAT       -CPAP or BIPAP mask and tubing (do not  bring machine)       -Skin prep instruction(s) sheet       -PAT Pass    Education booklet, brochure, handout or binder related to procedure given to patient.    When applicable, an ERAS booklet was given to patient.    Pain Control After Surgery handout given to patient.    Respirex use (handout given to patient) and pneumonia prevention education provided.    Signs and Symptoms of infection discussed.    DVT Prevention education given.  Stressing the importance of ambulation.    Please apply Chlorhexadine wipes to surgical area (if instructed) the night before procedure and the AM of procedure and document date/time of applications on skin prep instruction sheet.        Lumbar Surgery

## 2019-11-11 NOTE — PAT
The following information and instructions were given:    Do not eat, drink, smoke or chew gum after midnight the night before surgery. This also includes no mints.  Take all routine, prescribed medications including heart and blood pressure medicines with a sip of water unless otherwise instructed by your physician.   Do NOT take diabetic medication unless instructed by your physician.    If you were instructed to drink 20 ounces (or until full) of Gatorade the morning of surgery, the Gatorade must be completed 1 hour before arrival time on the day of surgery .  No RED Gatorade.      DO NOT shave for two days before your procedure.  Do not wear makeup.      DO NOT wear fingernail polish (gel/regular) and/or acrylic/artificial nails on the day of surgery.   If you have had a recent manicure and would rather not remove polish or artificial nails, then the minimum requirement is that the polish/artificial nails must be removed from the middle finger on each hand.      If you are having surgery/procedure on an upper extremity, then fingernail polish/artificial fingernails must be removed for surgery.  NO EXCEPTIONS.      If you are having surgery/procedure on a lower extremity, then toenail polish on both extremities must be removed for surgery.  NO EXCEPTIONS.    Remove all jewelry (advise to go to jeweler if unable to remove).  Jewelry, especially rings, can no longer be taped for surgery.    Leave anything you consider valuable at home.    Leave your suitcase in the car until after your surgery.    Bring the following with you the day of your procedure (when applicable)       -picture ID and insurance cards       -Co-pay/deductible required by insurance       -Medications in the original bottles (not a list) including all over-the-counter medications if not brought to PAT       -Copy of advance directive, living will or power of  documents if not brought to PAT       -CPAP or BIPAP mask and tubing (do not  bring machine)       -Skin prep instruction(s) sheet       -PAT Pass    Education booklet, brochure, handout or binder related to procedure given to patient.    When applicable, an ERAS booklet was given to patient.    Pain Control After Surgery handout given to patient.    Respirex use (handout given to patient) and pneumonia prevention education provided.    Signs and Symptoms of infection discussed.    DVT Prevention education given.  Stressing the importance of ambulation.    Please apply Chlorhexadine wipes to surgical area (if instructed) the night before procedure and the AM of procedure and document date/time of applications on skin prep instruction sheet.        Bactroban and Chlorhexidine Prescription given during PAT visit, as well as written and verbal instructions given to patient during PAT visit.  Patient/family also instructed to complete skin prep checklist and return the checklist on the day of surgery to preoperative staff.  Patient/family verbalized understanding.

## 2019-11-12 LAB — MRSA SPEC QL CULT: NORMAL

## 2019-11-15 ENCOUNTER — ANESTHESIA EVENT (OUTPATIENT)
Dept: PERIOP | Facility: HOSPITAL | Age: 69
End: 2019-11-15

## 2019-11-15 RX ORDER — FAMOTIDINE 10 MG/ML
20 INJECTION, SOLUTION INTRAVENOUS ONCE
Status: CANCELLED | OUTPATIENT
Start: 2019-11-15 | End: 2019-11-15

## 2019-11-18 ENCOUNTER — APPOINTMENT (OUTPATIENT)
Dept: GENERAL RADIOLOGY | Facility: HOSPITAL | Age: 69
End: 2019-11-18

## 2019-11-18 ENCOUNTER — HOSPITAL ENCOUNTER (INPATIENT)
Facility: HOSPITAL | Age: 69
LOS: 2 days | Discharge: HOME | End: 2019-11-20
Attending: NEUROLOGICAL SURGERY | Admitting: NEUROLOGICAL SURGERY

## 2019-11-18 ENCOUNTER — ANESTHESIA (OUTPATIENT)
Dept: PERIOP | Facility: HOSPITAL | Age: 69
End: 2019-11-18

## 2019-11-18 DIAGNOSIS — Z74.09 IMPAIRED MOBILITY AND ADLS: Primary | ICD-10-CM

## 2019-11-18 DIAGNOSIS — Z78.9 IMPAIRED MOBILITY AND ADLS: Primary | ICD-10-CM

## 2019-11-18 DIAGNOSIS — M48.062 LUMBAR STENOSIS WITH NEUROGENIC CLAUDICATION: ICD-10-CM

## 2019-11-18 PROCEDURE — 76000 FLUOROSCOPY <1 HR PHYS/QHP: CPT

## 2019-11-18 PROCEDURE — 25010000002 HYDROMORPHONE PER 4 MG: Performed by: NURSE ANESTHETIST, CERTIFIED REGISTERED

## 2019-11-18 PROCEDURE — 61783 SCAN PROC SPINAL: CPT | Performed by: NEUROLOGICAL SURGERY

## 2019-11-18 PROCEDURE — 25010000002 ONDANSETRON PER 1 MG: Performed by: NURSE ANESTHETIST, CERTIFIED REGISTERED

## 2019-11-18 PROCEDURE — 25010000002 FENTANYL CITRATE (PF) 100 MCG/2ML SOLUTION: Performed by: NURSE ANESTHETIST, CERTIFIED REGISTERED

## 2019-11-18 PROCEDURE — 25010000003 CEFAZOLIN IN DEXTROSE 2-4 GM/100ML-% SOLUTION: Performed by: NEUROLOGICAL SURGERY

## 2019-11-18 PROCEDURE — C1713 ANCHOR/SCREW BN/BN,TIS/BN: HCPCS | Performed by: NEUROLOGICAL SURGERY

## 2019-11-18 PROCEDURE — 22853 INSJ BIOMECHANICAL DEVICE: CPT | Performed by: NEUROLOGICAL SURGERY

## 2019-11-18 PROCEDURE — 25010000002 PROPOFOL 10 MG/ML EMULSION: Performed by: NURSE ANESTHETIST, CERTIFIED REGISTERED

## 2019-11-18 PROCEDURE — 22840 INSERT SPINE FIXATION DEVICE: CPT | Performed by: NEUROLOGICAL SURGERY

## 2019-11-18 PROCEDURE — 25010000002 VANCOMYCIN 1 G RECONSTITUTED SOLUTION: Performed by: NEUROLOGICAL SURGERY

## 2019-11-18 PROCEDURE — 22633 ARTHRD CMBN 1NTRSPC LUMBAR: CPT | Performed by: NEUROLOGICAL SURGERY

## 2019-11-18 PROCEDURE — 0SG00AJ FUSION OF LUMBAR VERTEBRAL JOINT WITH INTERBODY FUSION DEVICE, POSTERIOR APPROACH, ANTERIOR COLUMN, OPEN APPROACH: ICD-10-PCS | Performed by: NEUROLOGICAL SURGERY

## 2019-11-18 PROCEDURE — 25010000002 NEOSTIGMINE 10 MG/10ML SOLUTION: Performed by: NURSE ANESTHETIST, CERTIFIED REGISTERED

## 2019-11-18 DEVICE — DBM T44145 5CC ORTHOBLEND SMALL DEFGRAFT
Type: IMPLANTABLE DEVICE | Site: SPINE LUMBAR | Status: FUNCTIONAL
Brand: GRAFTON®AND GRAFTON PLUS®DEMINERALIZED BONE MATRIX (DBM)

## 2019-11-18 DEVICE — CDH LEGACY PEEK 7226550 MAS 6.5X50MM
Type: IMPLANTABLE DEVICE | Site: SPINE LUMBAR | Status: FUNCTIONAL
Brand: CD HORIZON® SPINAL SYSTEM

## 2019-11-18 DEVICE — SPACER 2991022 CAPSTONE PEEK 10X22
Type: IMPLANTABLE DEVICE | Site: SPINE LUMBAR | Status: FUNCTIONAL
Brand: CAPSTONE® SPINAL SYSTEM

## 2019-11-18 DEVICE — BONE GRAFT KIT 7510100 INFUSE X SMALL
Type: IMPLANTABLE DEVICE | Site: SPINE LUMBAR | Status: FUNCTIONAL
Brand: INFUSE® BONE GRAFT

## 2019-11-18 RX ORDER — PROMETHAZINE HYDROCHLORIDE 12.5 MG/1
12.5 TABLET ORAL EVERY 6 HOURS PRN
Status: DISCONTINUED | OUTPATIENT
Start: 2019-11-18 | End: 2019-11-20 | Stop reason: HOSPADM

## 2019-11-18 RX ORDER — NALOXONE HCL 0.4 MG/ML
0.4 VIAL (ML) INJECTION
Status: DISCONTINUED | OUTPATIENT
Start: 2019-11-18 | End: 2019-11-20 | Stop reason: HOSPADM

## 2019-11-18 RX ORDER — CEFAZOLIN SODIUM 2 G/100ML
2 INJECTION, SOLUTION INTRAVENOUS ONCE
Status: COMPLETED | OUTPATIENT
Start: 2019-11-18 | End: 2019-11-18

## 2019-11-18 RX ORDER — PROMETHAZINE HYDROCHLORIDE 12.5 MG/1
12.5 SUPPOSITORY RECTAL EVERY 6 HOURS PRN
Status: DISCONTINUED | OUTPATIENT
Start: 2019-11-18 | End: 2019-11-20 | Stop reason: HOSPADM

## 2019-11-18 RX ORDER — FENTANYL CITRATE 50 UG/ML
50 INJECTION, SOLUTION INTRAMUSCULAR; INTRAVENOUS
Status: DISCONTINUED | OUTPATIENT
Start: 2019-11-18 | End: 2019-11-18 | Stop reason: HOSPADM

## 2019-11-18 RX ORDER — SODIUM CHLORIDE 0.9 % (FLUSH) 0.9 %
3 SYRINGE (ML) INJECTION EVERY 12 HOURS SCHEDULED
Status: DISCONTINUED | OUTPATIENT
Start: 2019-11-18 | End: 2019-11-20 | Stop reason: HOSPADM

## 2019-11-18 RX ORDER — ONDANSETRON 2 MG/ML
4 INJECTION INTRAMUSCULAR; INTRAVENOUS EVERY 6 HOURS PRN
Status: DISCONTINUED | OUTPATIENT
Start: 2019-11-18 | End: 2019-11-20 | Stop reason: HOSPADM

## 2019-11-18 RX ORDER — AMOXICILLIN 250 MG
1 CAPSULE ORAL NIGHTLY PRN
Status: DISCONTINUED | OUTPATIENT
Start: 2019-11-18 | End: 2019-11-20 | Stop reason: HOSPADM

## 2019-11-18 RX ORDER — SODIUM CHLORIDE, SODIUM LACTATE, POTASSIUM CHLORIDE, CALCIUM CHLORIDE 600; 310; 30; 20 MG/100ML; MG/100ML; MG/100ML; MG/100ML
90 INJECTION, SOLUTION INTRAVENOUS CONTINUOUS
Status: DISCONTINUED | OUTPATIENT
Start: 2019-11-18 | End: 2019-11-19

## 2019-11-18 RX ORDER — FAMOTIDINE 20 MG/1
20 TABLET, FILM COATED ORAL 2 TIMES DAILY PRN
Status: DISCONTINUED | OUTPATIENT
Start: 2019-11-18 | End: 2019-11-20 | Stop reason: HOSPADM

## 2019-11-18 RX ORDER — ROCURONIUM BROMIDE 10 MG/ML
INJECTION, SOLUTION INTRAVENOUS AS NEEDED
Status: DISCONTINUED | OUTPATIENT
Start: 2019-11-18 | End: 2019-11-18 | Stop reason: SURG

## 2019-11-18 RX ORDER — SODIUM CHLORIDE 0.9 % (FLUSH) 0.9 %
3 SYRINGE (ML) INJECTION EVERY 12 HOURS SCHEDULED
Status: DISCONTINUED | OUTPATIENT
Start: 2019-11-18 | End: 2019-11-18 | Stop reason: HOSPADM

## 2019-11-18 RX ORDER — PROMETHAZINE HYDROCHLORIDE 25 MG/ML
6.25 INJECTION, SOLUTION INTRAMUSCULAR; INTRAVENOUS ONCE AS NEEDED
Status: DISCONTINUED | OUTPATIENT
Start: 2019-11-18 | End: 2019-11-18 | Stop reason: HOSPADM

## 2019-11-18 RX ORDER — BISACODYL 10 MG
10 SUPPOSITORY, RECTAL RECTAL DAILY PRN
Status: DISCONTINUED | OUTPATIENT
Start: 2019-11-18 | End: 2019-11-20 | Stop reason: HOSPADM

## 2019-11-18 RX ORDER — FAMOTIDINE 20 MG/1
20 TABLET, FILM COATED ORAL ONCE
Status: COMPLETED | OUTPATIENT
Start: 2019-11-18 | End: 2019-11-18

## 2019-11-18 RX ORDER — DOCUSATE SODIUM 100 MG/1
100 CAPSULE, LIQUID FILLED ORAL 2 TIMES DAILY PRN
Status: DISCONTINUED | OUTPATIENT
Start: 2019-11-18 | End: 2019-11-20 | Stop reason: HOSPADM

## 2019-11-18 RX ORDER — OXYCODONE HCL 10 MG/1
10 TABLET, FILM COATED, EXTENDED RELEASE ORAL ONCE
Status: COMPLETED | OUTPATIENT
Start: 2019-11-18 | End: 2019-11-18

## 2019-11-18 RX ORDER — IBUPROFEN 800 MG/1
800 TABLET ORAL ONCE
Status: COMPLETED | OUTPATIENT
Start: 2019-11-18 | End: 2019-11-18

## 2019-11-18 RX ORDER — SODIUM CHLORIDE 0.9 % (FLUSH) 0.9 %
3-10 SYRINGE (ML) INJECTION AS NEEDED
Status: DISCONTINUED | OUTPATIENT
Start: 2019-11-18 | End: 2019-11-18 | Stop reason: HOSPADM

## 2019-11-18 RX ORDER — NALOXONE HCL 0.4 MG/ML
0.4 VIAL (ML) INJECTION AS NEEDED
Status: DISCONTINUED | OUTPATIENT
Start: 2019-11-18 | End: 2019-11-18 | Stop reason: HOSPADM

## 2019-11-18 RX ORDER — HYDROMORPHONE HYDROCHLORIDE 1 MG/ML
0.5 INJECTION, SOLUTION INTRAMUSCULAR; INTRAVENOUS; SUBCUTANEOUS
Status: DISCONTINUED | OUTPATIENT
Start: 2019-11-18 | End: 2019-11-18 | Stop reason: HOSPADM

## 2019-11-18 RX ORDER — ONDANSETRON 2 MG/ML
4 INJECTION INTRAMUSCULAR; INTRAVENOUS ONCE AS NEEDED
Status: DISCONTINUED | OUTPATIENT
Start: 2019-11-18 | End: 2019-11-18 | Stop reason: HOSPADM

## 2019-11-18 RX ORDER — MORPHINE SULFATE 4 MG/ML
5 INJECTION, SOLUTION INTRAMUSCULAR; INTRAVENOUS EVERY 4 HOURS PRN
Status: DISCONTINUED | OUTPATIENT
Start: 2019-11-18 | End: 2019-11-20 | Stop reason: HOSPADM

## 2019-11-18 RX ORDER — BUPIVACAINE HYDROCHLORIDE AND EPINEPHRINE 2.5; 5 MG/ML; UG/ML
INJECTION, SOLUTION EPIDURAL; INFILTRATION; INTRACAUDAL; PERINEURAL AS NEEDED
Status: DISCONTINUED | OUTPATIENT
Start: 2019-11-18 | End: 2019-11-18 | Stop reason: HOSPADM

## 2019-11-18 RX ORDER — MORPHINE SULFATE 4 MG/ML
2 INJECTION, SOLUTION INTRAMUSCULAR; INTRAVENOUS EVERY 4 HOURS PRN
Status: DISCONTINUED | OUTPATIENT
Start: 2019-11-18 | End: 2019-11-20 | Stop reason: HOSPADM

## 2019-11-18 RX ORDER — GLYCOPYRROLATE 0.2 MG/ML
INJECTION INTRAMUSCULAR; INTRAVENOUS AS NEEDED
Status: DISCONTINUED | OUTPATIENT
Start: 2019-11-18 | End: 2019-11-18 | Stop reason: SURG

## 2019-11-18 RX ORDER — HYDROCODONE BITARTRATE AND ACETAMINOPHEN 5; 325 MG/1; MG/1
1 TABLET ORAL ONCE AS NEEDED
Status: DISCONTINUED | OUTPATIENT
Start: 2019-11-18 | End: 2019-11-18 | Stop reason: HOSPADM

## 2019-11-18 RX ORDER — HYDRALAZINE HYDROCHLORIDE 20 MG/ML
5 INJECTION INTRAMUSCULAR; INTRAVENOUS
Status: DISCONTINUED | OUTPATIENT
Start: 2019-11-18 | End: 2019-11-18 | Stop reason: HOSPADM

## 2019-11-18 RX ORDER — ACETAMINOPHEN 325 MG/1
650 TABLET ORAL 3 TIMES DAILY
Status: DISCONTINUED | OUTPATIENT
Start: 2019-11-18 | End: 2019-11-20 | Stop reason: HOSPADM

## 2019-11-18 RX ORDER — NEOSTIGMINE METHYLSULFATE 1 MG/ML
INJECTION, SOLUTION INTRAVENOUS AS NEEDED
Status: DISCONTINUED | OUTPATIENT
Start: 2019-11-18 | End: 2019-11-18 | Stop reason: SURG

## 2019-11-18 RX ORDER — NICOTINE 21 MG/24HR
1 PATCH, TRANSDERMAL 24 HOURS TRANSDERMAL
Status: DISCONTINUED | OUTPATIENT
Start: 2019-11-18 | End: 2019-11-20 | Stop reason: HOSPADM

## 2019-11-18 RX ORDER — DIPHENHYDRAMINE HCL 25 MG
25 CAPSULE ORAL NIGHTLY PRN
Status: DISCONTINUED | OUTPATIENT
Start: 2019-11-18 | End: 2019-11-20 | Stop reason: HOSPADM

## 2019-11-18 RX ORDER — ONDANSETRON 4 MG/1
4 TABLET, FILM COATED ORAL EVERY 6 HOURS PRN
Status: DISCONTINUED | OUTPATIENT
Start: 2019-11-18 | End: 2019-11-20 | Stop reason: HOSPADM

## 2019-11-18 RX ORDER — FENTANYL CITRATE 50 UG/ML
INJECTION, SOLUTION INTRAMUSCULAR; INTRAVENOUS AS NEEDED
Status: DISCONTINUED | OUTPATIENT
Start: 2019-11-18 | End: 2019-11-18 | Stop reason: SURG

## 2019-11-18 RX ORDER — SODIUM CHLORIDE 0.9 % (FLUSH) 0.9 %
10 SYRINGE (ML) INJECTION AS NEEDED
Status: DISCONTINUED | OUTPATIENT
Start: 2019-11-18 | End: 2019-11-20 | Stop reason: HOSPADM

## 2019-11-18 RX ORDER — ONDANSETRON 2 MG/ML
INJECTION INTRAMUSCULAR; INTRAVENOUS AS NEEDED
Status: DISCONTINUED | OUTPATIENT
Start: 2019-11-18 | End: 2019-11-18 | Stop reason: SURG

## 2019-11-18 RX ORDER — LABETALOL HYDROCHLORIDE 5 MG/ML
5 INJECTION, SOLUTION INTRAVENOUS
Status: DISCONTINUED | OUTPATIENT
Start: 2019-11-18 | End: 2019-11-18 | Stop reason: HOSPADM

## 2019-11-18 RX ORDER — OXYCODONE HYDROCHLORIDE 15 MG/1
15 TABLET, FILM COATED, EXTENDED RELEASE ORAL EVERY 8 HOURS SCHEDULED
Status: DISCONTINUED | OUTPATIENT
Start: 2019-11-18 | End: 2019-11-20 | Stop reason: HOSPADM

## 2019-11-18 RX ORDER — SODIUM CHLORIDE 9 MG/ML
INJECTION, SOLUTION INTRAVENOUS AS NEEDED
Status: DISCONTINUED | OUTPATIENT
Start: 2019-11-18 | End: 2019-11-18 | Stop reason: HOSPADM

## 2019-11-18 RX ORDER — PROMETHAZINE HYDROCHLORIDE 25 MG/1
25 SUPPOSITORY RECTAL ONCE AS NEEDED
Status: DISCONTINUED | OUTPATIENT
Start: 2019-11-18 | End: 2019-11-18 | Stop reason: HOSPADM

## 2019-11-18 RX ORDER — PROPOFOL 10 MG/ML
VIAL (ML) INTRAVENOUS AS NEEDED
Status: DISCONTINUED | OUTPATIENT
Start: 2019-11-18 | End: 2019-11-18 | Stop reason: SURG

## 2019-11-18 RX ORDER — VANCOMYCIN HYDROCHLORIDE 1 G/20ML
INJECTION, POWDER, LYOPHILIZED, FOR SOLUTION INTRAVENOUS AS NEEDED
Status: DISCONTINUED | OUTPATIENT
Start: 2019-11-18 | End: 2019-11-18 | Stop reason: HOSPADM

## 2019-11-18 RX ORDER — SODIUM CHLORIDE, SODIUM LACTATE, POTASSIUM CHLORIDE, CALCIUM CHLORIDE 600; 310; 30; 20 MG/100ML; MG/100ML; MG/100ML; MG/100ML
9 INJECTION, SOLUTION INTRAVENOUS CONTINUOUS
Status: DISCONTINUED | OUTPATIENT
Start: 2019-11-18 | End: 2019-11-20 | Stop reason: HOSPADM

## 2019-11-18 RX ORDER — MAGNESIUM HYDROXIDE 1200 MG/15ML
LIQUID ORAL AS NEEDED
Status: DISCONTINUED | OUTPATIENT
Start: 2019-11-18 | End: 2019-11-18 | Stop reason: HOSPADM

## 2019-11-18 RX ORDER — OXYCODONE AND ACETAMINOPHEN 7.5; 325 MG/1; MG/1
1 TABLET ORAL EVERY 4 HOURS PRN
Status: DISCONTINUED | OUTPATIENT
Start: 2019-11-18 | End: 2019-11-20 | Stop reason: HOSPADM

## 2019-11-18 RX ORDER — MEPERIDINE HYDROCHLORIDE 25 MG/ML
12.5 INJECTION INTRAMUSCULAR; INTRAVENOUS; SUBCUTANEOUS
Status: DISCONTINUED | OUTPATIENT
Start: 2019-11-18 | End: 2019-11-18 | Stop reason: HOSPADM

## 2019-11-18 RX ORDER — IPRATROPIUM BROMIDE AND ALBUTEROL SULFATE 2.5; .5 MG/3ML; MG/3ML
3 SOLUTION RESPIRATORY (INHALATION) ONCE AS NEEDED
Status: DISCONTINUED | OUTPATIENT
Start: 2019-11-18 | End: 2019-11-18 | Stop reason: HOSPADM

## 2019-11-18 RX ORDER — LIDOCAINE HYDROCHLORIDE 10 MG/ML
INJECTION, SOLUTION EPIDURAL; INFILTRATION; INTRACAUDAL; PERINEURAL AS NEEDED
Status: DISCONTINUED | OUTPATIENT
Start: 2019-11-18 | End: 2019-11-18 | Stop reason: SURG

## 2019-11-18 RX ORDER — ACETAMINOPHEN 500 MG
1000 TABLET ORAL ONCE
Status: COMPLETED | OUTPATIENT
Start: 2019-11-18 | End: 2019-11-18

## 2019-11-18 RX ORDER — LIDOCAINE HYDROCHLORIDE 10 MG/ML
0.5 INJECTION, SOLUTION EPIDURAL; INFILTRATION; INTRACAUDAL; PERINEURAL ONCE AS NEEDED
Status: COMPLETED | OUTPATIENT
Start: 2019-11-18 | End: 2019-11-18

## 2019-11-18 RX ORDER — CEFAZOLIN SODIUM 2 G/100ML
2 INJECTION, SOLUTION INTRAVENOUS EVERY 8 HOURS
Status: COMPLETED | OUTPATIENT
Start: 2019-11-19 | End: 2019-11-19

## 2019-11-18 RX ORDER — PROMETHAZINE HYDROCHLORIDE 25 MG/1
25 TABLET ORAL ONCE AS NEEDED
Status: DISCONTINUED | OUTPATIENT
Start: 2019-11-18 | End: 2019-11-18 | Stop reason: HOSPADM

## 2019-11-18 RX ORDER — PROMETHAZINE HYDROCHLORIDE 25 MG/ML
12.5 INJECTION, SOLUTION INTRAMUSCULAR; INTRAVENOUS EVERY 6 HOURS PRN
Status: DISCONTINUED | OUTPATIENT
Start: 2019-11-18 | End: 2019-11-20 | Stop reason: HOSPADM

## 2019-11-18 RX ORDER — IBUPROFEN 600 MG/1
600 TABLET ORAL 3 TIMES DAILY
Status: DISCONTINUED | OUTPATIENT
Start: 2019-11-18 | End: 2019-11-20 | Stop reason: HOSPADM

## 2019-11-18 RX ADMIN — FAMOTIDINE 20 MG: 20 TABLET ORAL at 12:35

## 2019-11-18 RX ADMIN — PROPOFOL 200 MG: 10 INJECTION, EMULSION INTRAVENOUS at 15:21

## 2019-11-18 RX ADMIN — FENTANYL CITRATE 50 MCG: 0.05 INJECTION, SOLUTION INTRAMUSCULAR; INTRAVENOUS at 19:09

## 2019-11-18 RX ADMIN — NEOSTIGMINE METHYLSULFATE 2 MG: 1 INJECTION, SOLUTION INTRAVENOUS at 18:07

## 2019-11-18 RX ADMIN — FENTANYL CITRATE 50 MCG: 50 INJECTION, SOLUTION INTRAMUSCULAR; INTRAVENOUS at 15:21

## 2019-11-18 RX ADMIN — IBUPROFEN 800 MG: 800 TABLET ORAL at 12:35

## 2019-11-18 RX ADMIN — LIDOCAINE HYDROCHLORIDE 0.5 ML: 10 INJECTION, SOLUTION EPIDURAL; INFILTRATION; INTRACAUDAL; PERINEURAL at 12:34

## 2019-11-18 RX ADMIN — GLYCOPYRROLATE 0.4 MG: 0.2 INJECTION, SOLUTION INTRAMUSCULAR; INTRAVENOUS at 18:07

## 2019-11-18 RX ADMIN — FENTANYL CITRATE 50 MCG: 0.05 INJECTION, SOLUTION INTRAMUSCULAR; INTRAVENOUS at 18:41

## 2019-11-18 RX ADMIN — LIDOCAINE HYDROCHLORIDE 50 MG: 10 INJECTION, SOLUTION EPIDURAL; INFILTRATION; INTRACAUDAL; PERINEURAL at 15:21

## 2019-11-18 RX ADMIN — SODIUM CHLORIDE, POTASSIUM CHLORIDE, SODIUM LACTATE AND CALCIUM CHLORIDE 90 ML/HR: 600; 310; 30; 20 INJECTION, SOLUTION INTRAVENOUS at 20:29

## 2019-11-18 RX ADMIN — ROCURONIUM BROMIDE 50 MG: 10 INJECTION INTRAVENOUS at 15:21

## 2019-11-18 RX ADMIN — OXYCODONE HYDROCHLORIDE 10 MG: 10 TABLET, FILM COATED, EXTENDED RELEASE ORAL at 12:34

## 2019-11-18 RX ADMIN — ACETAMINOPHEN 650 MG: 325 TABLET ORAL at 20:27

## 2019-11-18 RX ADMIN — CEFAZOLIN SODIUM 2 G: 2 INJECTION, SOLUTION INTRAVENOUS at 15:24

## 2019-11-18 RX ADMIN — ACETAMINOPHEN 1000 MG: 500 TABLET ORAL at 12:34

## 2019-11-18 RX ADMIN — IBUPROFEN 600 MG: 600 TABLET ORAL at 20:27

## 2019-11-18 RX ADMIN — HYDROMORPHONE HYDROCHLORIDE 0.5 MG: 1 INJECTION, SOLUTION INTRAMUSCULAR; INTRAVENOUS; SUBCUTANEOUS at 18:58

## 2019-11-18 RX ADMIN — ONDANSETRON 4 MG: 2 INJECTION INTRAMUSCULAR; INTRAVENOUS at 17:14

## 2019-11-18 RX ADMIN — EPHEDRINE SULFATE 10 MG: 50 INJECTION INTRAMUSCULAR; INTRAVENOUS; SUBCUTANEOUS at 16:45

## 2019-11-18 RX ADMIN — SODIUM CHLORIDE, POTASSIUM CHLORIDE, SODIUM LACTATE AND CALCIUM CHLORIDE 9 ML/HR: 600; 310; 30; 20 INJECTION, SOLUTION INTRAVENOUS at 12:34

## 2019-11-18 RX ADMIN — FENTANYL CITRATE 50 MCG: 0.05 INJECTION, SOLUTION INTRAMUSCULAR; INTRAVENOUS at 18:46

## 2019-11-18 RX ADMIN — HYDROMORPHONE HYDROCHLORIDE 0.5 MG: 1 INJECTION, SOLUTION INTRAMUSCULAR; INTRAVENOUS; SUBCUTANEOUS at 18:52

## 2019-11-18 RX ADMIN — OXYCODONE HYDROCHLORIDE 15 MG: 15 TABLET, FILM COATED, EXTENDED RELEASE ORAL at 20:27

## 2019-11-18 RX ADMIN — FENTANYL CITRATE 50 MCG: 50 INJECTION, SOLUTION INTRAMUSCULAR; INTRAVENOUS at 16:32

## 2019-11-18 RX ADMIN — SODIUM CHLORIDE, POTASSIUM CHLORIDE, SODIUM LACTATE AND CALCIUM CHLORIDE: 600; 310; 30; 20 INJECTION, SOLUTION INTRAVENOUS at 17:12

## 2019-11-18 NOTE — ANESTHESIA POSTPROCEDURE EVALUATION
Patient: Johnnie Pablo    Procedure Summary     Date:  11/18/19 [facility] / [facility]:   [facility]    Anesthesia Start:  1516 Anesthesia Stop:  1835    Procedure:  LUMBAR FUSION DECOMPRESSON WITH PEDICLE SCREWS L4-5 (N/A Spine Lumbar) Diagnosis:       Lumbar stenosis with neurogenic claudication      (Lumbar stenosis with neurogenic claudication [M48.062])    Surgeon:  Farhat Santa MD Provider:  J Carlos Ferrer MD    Anesthesia Type:  general ASA Status:  2          Anesthesia Type: general  Last vitals  BP   125/87 (11/18/19 1246)   Temp   97.4 °F (36.3 °C) (11/18/19 1246)   Pulse   62 (11/18/19 1246)   Resp         SpO2   95 % (11/18/19 1246)     Post Anesthesia Care and Evaluation    Patient location during evaluation: PACU  Patient participation: complete - patient participated  Level of consciousness: awake and alert  Pain score: 0  Pain management: adequate  Airway patency: patent  Anesthetic complications: No anesthetic complications  PONV Status: none  Cardiovascular status: hemodynamically stable and acceptable  Respiratory status: nonlabored ventilation, acceptable and nasal cannula  Hydration status: acceptable

## 2019-11-18 NOTE — INTERVAL H&P NOTE
"Pre-Op H&P (See Recent Office Note Attached for Full H&P)    Chief complaint: Low back pain into bilateral hips with numbness/buring into buttocks and legs, L>R    Review of Systems:  General ROS:  no fever, chills, rashes, No change since last office visit  Cardiovascular ROS: no chest pain or dyspnea on exertion  Respiratory ROS: no cough, shortness of breath, or wheezing    Meds:    No current facility-administered medications on file prior to encounter.      Current Outpatient Medications on File Prior to Encounter   Medication Sig Dispense Refill   • sulindac (CLINORIL) 200 MG tablet Take 200 mg by mouth 2 (Two) Times a Day.     • oxyCODONE-acetaminophen (PERCOCET) 5-325 MG per tablet Take 1-2 tablets by mouth Every 4 (Four) Hours As Needed for pain. 30 tablet 0       Vital Signs:  /87 (BP Location: Right arm, Patient Position: Lying)   Pulse 62   Temp 97.4 °F (36.3 °C) (Temporal)   Ht 175.3 cm (69\")   Wt 98 kg (216 lb 0.8 oz)   SpO2 95%   BMI 31.91 kg/m²     Physical Exam:    CV:  S1S2 regular rate and rhythm, no murmur               Resp:  Clear to auscultation; respirations regular, even and unlabored    Results Review:     Lab Results   Component Value Date    WBC 8.18 11/11/2019    HGB 15.2 11/11/2019    HCT 49.3 11/11/2019    MCV 90.0 11/11/2019     11/11/2019        I reviewed the patient's new clinical results.    Cancer Staging (if applicable)  Cancer Patient: __ yes _x_no __unknown; If yes, clinical stage T:__ N:__M:__, stage group or __N/A    Assessment/Plan:    Diagnosis:   1.  Lumbar stenosis with neurogenic claudication.  2.  L4-5 spondylolisthesis with instability.  3.  Lumbar degenerative disc disease.  4.  Tobacco abuse, dramatically improved.     Treatment Options:   I have recommended L4-5 decompression, fusion, stabilization.  I think this will help significantly with his symptoms but it is unlikely to completely eradicate them given the degenerative change in his back.  " Some of his pain is certainly mechanical in nature.  The nature of the procedure as well as the potential risks, complications, limitations, and alternatives to the procedure were discussed at length with the patient and the patient has agreed to proceed with surgery.  If possible I would like to utilize BMP to facilitate fusion.  I explained to him the rationale for doing so and that this would be an off label use    Xin Douglas, KEYANA  11/18/2019   2:09 PM

## 2019-11-18 NOTE — OP NOTE
NEUROSURGICAL OPERATIVE NOTE        PREOPERATIVE DIAGNOSIS:    L4-5 stenosis and spondylolisthesis with instability      POSTOPERATIVE DIAGNOSIS:  Same      PROCEDURE:  1.  Arthrodesis interbody type L4-5  2.  Bilateral L4-5 laminotomies, partial facetectomies, and foraminotomies  3.  Bilateral L4-5 discectomy with bilateral Capstone cage placement  4.  Nonsegmental pedicle screw fixation at L4-5 utilizing PEEK rods  5.  Use of [place], BMP, and local autograft  6.  Stealth frameless stereotaxy utilized in conjunction with O arm imaging      SURGEON:  Farhat Santa M.D.      ASSISTANT: Nany Almaraz PA-C      ANESTHESIA:  General      ESTIMATED BLOOD LOSS: 200 mL      SPECIMEN: None      DRAINS: Hemovac      COMPLICATIONS:  None      CLINICAL NOTE:  The patient is a 69-year-old gentleman with a protracted and progressive course of back and bilateral lower extremity pain with walking and standing intolerance.  Studies demonstrate spondylolisthesis at L4-5 with evidence of instability and significant stenosis.  As such, he presents at this time for lumbar decompression, fusion, and stabilization.  The nature of the procedure as well as the potential risks, complications, limitations, and alternatives to the procedure were discussed at length with the patient and the patient has agreed to proceed with surgery.      TECHNICAL NOTE:  The patient was brought to the operating room and while on his cart, general endotracheal anesthesia was achieved.  He was then turned prone onto the Diony table.  Special care was ensured to protect pressure points.  His low back was prepared and draped in the usual fashion.  A localizing radiograph was obtained with a spinal needle in the lumbosacral midline using the C-arm.  Based on this, a 7 cm vertical incision was fashioned.  Underlying tissues were divided with cautery to provide exposure to the posterior spinal elements at L4-L5.  A reference frame was affixed to the  spinous process.  O-arm images ensued.  These images were downloaded into the stealth station.  Using stealth frameless stereotaxy, each of the pedicle screw hole sites at L4 and L5 bilaterally were marked, drilled and tapped.  The 6.5 cm screws were used throughout.  These were 50 mm length bilaterally at L4, 45 mm length on the left at L5 and 40 mm length on the right at L5.  Generous laminotomies, partial facetectomies and foraminotomies were performed on each side to free up the L4 and L5 nerve roots.  Beginning on the right, the disc was incised and evacuated piecemeal with an array of pituitary rongeurs, curettes.  Serial impacters were impacted into the disc space.  Ultimately, a 10 x 22 mm capstone cage packed with a combination of a BMP sponge and Renard was impacted into the disc space using fluoroscopic guidance.  The contralateral side of the disc space was prepared in a similar fashion.  Prior to placing a second cage, a [place] and a large BMP sponge was placed in the midline anteriorly.  The second 10 x 22 mm capstone cage was impacted into place.  Peak rods measuring 30 mm on the left and 35 mm on the right where affixed to the screw heads.  Set screws were applied.  Compression across the disc space was performed before tightening and breaking off the set screws.  A Hemovac drain was brought through a separate stab incision and left in the epidural space.  Vancomycin powder was sprinkled in the depths and then more superficially as the wound was closed.  Paraspinous muscle and fascia were reapproximated in interrupted fashion with 0 Vicryl suture.  Then, 0.25% Marcaine was instilled in the paraspinous musculature and subcutaneous tissues.  Subcutaneous tissues were closed in layers with 2-0 followed by 3-0 Vicryl sutures in a running subcuticular fashion with 3-0 Vicryl sutures.  Steri-Strips and a sterile dressing were applied.  He was subsequently rolled onto his cart, extubated and taken to the  recovery room in satisfactory condition.            Farhat Santa M.D.

## 2019-11-18 NOTE — ANESTHESIA PREPROCEDURE EVALUATION
Anesthesia Evaluation     Patient summary reviewed and Nursing notes reviewed   no history of anesthetic complications:  NPO Solid Status: > 8 hours  NPO Liquid Status: > 2 hours           Airway   Mallampati: II  TM distance: >3 FB  Neck ROM: full  Possible difficult intubation  Dental - normal exam     Pulmonary    (+) a smoker Current Abstained day of surgery, COPD moderate, decreased breath sounds,   Cardiovascular   Exercise tolerance: good (4-7 METS)    Rhythm: regular  Rate: normal        Neuro/Psych  GI/Hepatic/Renal/Endo    (+)  GERD,      Musculoskeletal     (+) back pain,   Abdominal   (+) obese,     Abdomen: soft.   Substance History      OB/GYN          Other   arthritis,    history of cancer remission                    Anesthesia Plan    ASA 2     general     intravenous induction     Anesthetic plan, all risks, benefits, and alternatives have been provided, discussed and informed consent has been obtained with: patient.    Plan discussed with CRNA.

## 2019-11-18 NOTE — ANESTHESIA PROCEDURE NOTES
Airway  Urgency: elective    Date/Time: 11/18/2019 3:23 PM  Airway not difficult    General Information and Staff    Patient location during procedure: OR  CRNA: Aimee Patel CRNA    Indications and Patient Condition  Indications for airway management: airway protection    Preoxygenated: yes  MILS not maintained throughout  Mask difficulty assessment: 1 - vent by mask    Final Airway Details  Final airway type: endotracheal airway      Successful airway: ETT  Cuffed: yes   Successful intubation technique: video laryngoscopy  Endotracheal tube insertion site: oral  Blade: Maryse  Blade size: 3  ETT size (mm): 7.5  Cormack-Lehane Classification: grade I - full view of glottis  Placement verified by: chest auscultation and capnometry   Cuff volume (mL): 9  Measured from: lips  ETT/EBT  to lips (cm): 23  Number of attempts at approach: 1    Additional Comments  Negative epigastric sounds, Breath sound equal bilaterally with symmetric chest rise and fall

## 2019-11-19 LAB
HCT VFR BLD AUTO: 40.6 % (ref 37.5–51)
HGB BLD-MCNC: 12.9 G/DL (ref 13–17.7)

## 2019-11-19 PROCEDURE — 97165 OT EVAL LOW COMPLEX 30 MIN: CPT

## 2019-11-19 PROCEDURE — 99024 POSTOP FOLLOW-UP VISIT: CPT | Performed by: NEUROLOGICAL SURGERY

## 2019-11-19 PROCEDURE — 97535 SELF CARE MNGMENT TRAINING: CPT

## 2019-11-19 PROCEDURE — 85018 HEMOGLOBIN: CPT | Performed by: NEUROLOGICAL SURGERY

## 2019-11-19 PROCEDURE — 97116 GAIT TRAINING THERAPY: CPT

## 2019-11-19 PROCEDURE — 97161 PT EVAL LOW COMPLEX 20 MIN: CPT

## 2019-11-19 PROCEDURE — 25010000003 CEFAZOLIN IN DEXTROSE 2-4 GM/100ML-% SOLUTION: Performed by: NEUROLOGICAL SURGERY

## 2019-11-19 PROCEDURE — 85014 HEMATOCRIT: CPT | Performed by: NEUROLOGICAL SURGERY

## 2019-11-19 RX ADMIN — OXYCODONE HYDROCHLORIDE 15 MG: 15 TABLET, FILM COATED, EXTENDED RELEASE ORAL at 20:28

## 2019-11-19 RX ADMIN — OXYCODONE HYDROCHLORIDE 15 MG: 15 TABLET, FILM COATED, EXTENDED RELEASE ORAL at 06:12

## 2019-11-19 RX ADMIN — ACETAMINOPHEN 650 MG: 325 TABLET ORAL at 16:08

## 2019-11-19 RX ADMIN — IBUPROFEN 600 MG: 600 TABLET ORAL at 16:08

## 2019-11-19 RX ADMIN — IBUPROFEN 600 MG: 600 TABLET ORAL at 20:28

## 2019-11-19 RX ADMIN — CEFAZOLIN SODIUM 2 G: 2 INJECTION, SOLUTION INTRAVENOUS at 01:12

## 2019-11-19 RX ADMIN — OXYCODONE HYDROCHLORIDE 15 MG: 15 TABLET, FILM COATED, EXTENDED RELEASE ORAL at 13:53

## 2019-11-19 RX ADMIN — ACETAMINOPHEN 650 MG: 325 TABLET ORAL at 20:29

## 2019-11-19 RX ADMIN — IBUPROFEN 600 MG: 600 TABLET ORAL at 08:01

## 2019-11-19 RX ADMIN — CEFAZOLIN SODIUM 2 G: 2 INJECTION, SOLUTION INTRAVENOUS at 08:01

## 2019-11-19 RX ADMIN — ACETAMINOPHEN 650 MG: 325 TABLET ORAL at 08:01

## 2019-11-19 RX ADMIN — SODIUM CHLORIDE, PRESERVATIVE FREE 3 ML: 5 INJECTION INTRAVENOUS at 20:30

## 2019-11-19 NOTE — PLAN OF CARE
Problem: Patient Care Overview  Goal: Plan of Care Review  Outcome: Ongoing (interventions implemented as appropriate)   11/19/19 1025   OTHER   Outcome Summary OT IE completed. Pt motivated to work with therapy; to regain functional independence. Alert, Ox4 and reports good pain control. CGA to transition to EOB via logroll. SBA transfers and hallway ambulation x700 ft. Education initiated for spinal precautions with ADLs and use of AE. Pt requests CM order toilet seat riser for d/c. OT will follow IP to advance self-care performance. Plan is home with spouse assist at d/c.    Coping/Psychosocial   Plan of Care Reviewed With patient

## 2019-11-19 NOTE — THERAPY EVALUATION
Patient Name: Johnnie Pablo  : 1950    MRN: [id number]                              Today's Date: 2019       Admit Date: 2019    Visit Dx:     ICD-10-CM ICD-9-CM   1. Impaired mobility and ADLs Z74.09 799.89   2. Lumbar stenosis with neurogenic claudication M48.062 724.03     Patient Active Problem List   Diagnosis   • Spondylolisthesis of lumbar region   • Lumbar stenosis with neurogenic claudication     Past Medical History:   Diagnosis Date   • Arthritis    • Back pain     left side worse   • Cancer (CMS/HCC)     Skin   • GERD (gastroesophageal reflux disease)    • Skin irritation     see dermatology uses cream     Past Surgical History:   Procedure Laterality Date   • COLONOSCOPY     • GALLBLADDER SURGERY     • HEMORRHOIDECTOMY     • JOINT REPLACEMENT      bilateral partial   • KNEE ARTHROPLASTY, PARTIAL REPLACEMENT Bilateral    • LUMBAR LAMINECTOMY WITH FUSION N/A 2019    Procedure: LUMBAR FUSION DECOMPRESSON WITH PEDICLE SCREWS L4-5;  Surgeon: Farhat Santa MD;  Location: [facility];  Service: Neurosurgery   • SKIN CANCER EXCISION      all over per patient     General Information     Row Name 19 1514          PT Evaluation Time/Intention    Document Type  evaluation  -AA     Mode of Treatment  physical therapy  -AA     Row Name 19 1514          General Information    Patient Profile Reviewed?  yes  -AA     Prior Level of Function  independent:;all household mobility;community mobility  -AA     Existing Precautions/Restrictions  spinal;other (see comments) hemovac  -AA     Barriers to Rehab  previous functional deficit  -AA     Row Name 19 1514          Relationship/Environment    Lives With  spouse  -AA     Row Name 19 1514          Resource/Environmental Concerns    Current Living Arrangements  home/apartment/condo  -AA     Row Name 19 1514          Stairs Within Home, Primary    Stairs, Within Home, Primary  13  -AA     Stair Railings, Within  Home, Primary  railing on left side (ascending)  -AA     Row Name 11/19/19 1514          Cognitive Assessment/Intervention- PT/OT    Orientation Status (Cognition)  oriented x 4  -AA     Row Name 11/19/19 1514          Safety Issues, Functional Mobility    Safety Issues Affecting Function (Mobility)  safety precaution awareness;safety precautions follow-through/compliance  -AA     Impairments Affecting Function (Mobility)  pain  -AA       User Key  (r) = Recorded By, (t) = Taken By, (c) = Cosigned By    Initials Name Provider Type    AA Rosalia Rollins, PT Physical Therapist        Mobility     Row Name 11/19/19 1514          Bed Mobility Assessment/Treatment    Comment (Bed Mobility)  pt UIC  -AA     Row Name 11/19/19 1514          Transfer Assessment/Treatment    Comment (Transfers)  VC for spinal precautions  -AA     Row Name 11/19/19 1514          Bed-Chair Transfer    Bed-Chair [place] (Transfers)  supervision  -AA     Assistive Device (Bed-Chair Transfers)  other (see comments) gait belt  -AA     Row Name 11/19/19 1514          Sit-Stand Transfer    Sit-Stand [place] (Transfers)  set up  -AA     Assistive Device (Sit-Stand Transfers)  other (see comments) gait belt  -AA     Row Name 11/19/19 1514          Gait/Stairs Assessment/Training    Gait/Stairs Assessment/Training  gait/ambulation independence;distance ambulated;gait pattern;stairs negotiation  -AA     [place] Level (Gait)  set up  -AA     Assistive Device (Gait)  other (see comments) gait belt  -AA     Distance in Feet (Gait)  750  -AA     Pattern (Gait)  swing-through  -AA     Deviations/Abnormal Patterns (Gait)  bilateral deviations;stride length decreased  -AA     Negotiation (Stairs)  stairs independence  -AA     [place] Level (Stairs)  supervision  -AA     Handrail Location (Stairs)  left side (ascending)  -AA     Number of Steps (Stairs)  28  -AA     Ascending Technique (Stairs)  step-over-step  -AA     Descending Technique  (Stairs)  step-over-step  -AA     Stairs, Impairments  strength decreased  -AA     Comment (Gait/Stairs)  Pt ambulated with VC for spinal precautions, no LOB, no BLE pain.  Pt ambulated up and down 2 flights of stairs with SPV and L HR with cues for sequencing  -AA       User Key  (r) = Recorded By, (t) = Taken By, (c) = Cosigned By    Initials Name Provider Type    AA Rosalia Rollins, PT Physical Therapist        Obj/Interventions     Row Name 11/19/19 1514          General ROM    GENERAL ROM COMMENTS  BLE WNL  -AA     Row Name 11/19/19 1514          MMT (Manual Muscle Testing)    General MMT Comments  4+/5 BLE   -AA     Row Name 11/19/19 1514          Therapeutic Exercise    Lower Extremity (Therapeutic Exercise)  gluteal sets;heel slides, bilateral;quad sets, bilateral  -AA     Lower Extremity Range of Motion (Therapeutic Exercise)  ankle dorsiflexion/plantar flexion, bilateral  -AA     Core Strength (Therapeutic Exercise)  posterior pelvic tilt  -AA     Exercise Type (Therapeutic Exercise)  AROM (active range of motion)  -AA     Sets/Reps (Therapeutic Exercise)  10  -AA     Row Name 11/19/19 1514          Static Sitting Balance    Level of [place] (Unsupported Sitting, Static Balance)  independent  -AA     Sitting Position (Unsupported Sitting, Static Balance)  sitting in chair  -AA     Time Able to Maintain Position (Unsupported Sitting, Static Balance)  more than 5 minutes  -AA     Row Name 11/19/19 1514          Dynamic Sitting Balance    Level of [place], Reaches Outside Midline (Sitting, Dynamic Balance)  independent  -AA     Sitting Position, Reaches Outside Midline (Sitting, Dynamic Balance)  sitting in chair  -AA     Row Name 11/19/19 1514          Static Standing Balance    Level of [place] (Supported Standing, Static Balance)  conditional independence  -AA     Row Name 11/19/19 1514          Dynamic Standing Balance    Level of [place], Reaches Outside Midline (Standing, Dynamic  Balance)  supervision  -[facility] Name 11/19/19 1514          Sensory Assessment/Intervention    Sensory General Assessment  no sensation deficits identified  -AA       User Key  (r) = Recorded By, (t) = Taken By, (c) = Cosigned By    Initials Name Provider Type    Rosalia Harvey, PT Physical Therapist        Goals/Plan     Row Name 11/19/19 1514          Transfer Goal 1 (PT)    Activity/Assistive Device (Transfer Goal 1, PT)  sit-to-stand/stand-to-sit;bed-to-chair/chair-to-bed  -AA     [place] Level/Cues Needed (Transfer Goal 1, PT)  independent  -AA     Time Frame (Transfer Goal 1, PT)  3 days  -AA     Row Name 11/19/19 1514          Gait Training Goal 1 (PT)    Activity/Assistive Device (Gait Training Goal 1, PT)  gait (walking locomotion)  -AA     [place] Level (Gait Training Goal 1, PT)  independent  -AA     Distance (Gait Goal 1, PT)  1000 feet  -AA     Time Frame (Gait Training Goal 1, PT)  3 days  -AA     Row Name 11/19/19 1514          Stairs Goal 1 (PT)    Activity/Assistive Device (Stairs Goal 1, PT)  stairs, all skills  -AA     [place] Level/Cues Needed (Stairs Goal 1, PT)  independent  -AA     Number of Stairs (Stairs Goal 1, PT)  12  -AA     Time Frame (Stairs Goal 1, PT)  3 days  -AA     Row Name 11/19/19 1514          Patient Education Goal (PT)    Activity (Patient Education Goal, PT)  HEP  -AA     [place]/Cues/Accuracy (Memory Goal 2, PT)  demonstrates adequately;independent  -AA     Time Frame (Patient Education Goal, PT)  3 days  -AA       User Key  (r) = Recorded By, (t) = Taken By, (c) = Cosigned By    Initials Name Provider Type    Rosalia Harvey, PT Physical Therapist        Clinical Impression     Row Name 11/19/19 1514          Pain Assessment    Additional Documentation  Pain Scale: Numbers Pre/Post-Treatment (Group)  -AA     Row Name 11/19/19 1514          Pain Scale: Numbers Pre/Post-Treatment    Pain Scale: Numbers, Pretreatment  1/10  -AA     Pain Scale:  Numbers, Post-Treatment  2/10  -AA     Pain Location - Side  Bilateral  -AA     Pain Location - Orientation  incisional  -AA     Pain Location  back  -AA     Pain Intervention(s)  Repositioned;Ambulation/increased activity  -AA     Row Name 11/19/19 1514          Plan of Care Review    Plan of Care Reviewed With  patient;spouse  -AA     Row Name 11/19/19 1514          Physical Therapy Clinical Impression    Criteria for Skilled Interventions Met (PT Clinical Impression)  yes;treatment indicated  -AA     Rehab Potential (PT Clinical Summary)  good, to achieve stated therapy goals  -AA     Predicted Duration of Therapy (PT)  3 days  -AA     Row Name 11/19/19 1514          Positioning and Restraints    Pre-Treatment Position  sitting in chair/recliner  -AA     Post Treatment Position  chair  -AA     In Chair  notified nsg;sitting;with family/caregiver;call light within reach;encouraged to call for assist  -AA       User Key  (r) = Recorded By, (t) = Taken By, (c) = Cosigned By    Initials Name Provider Type    Rosalia Harvey, PT Physical Therapist        Outcome Measures     Row Name 11/19/19 1514          How much help from another person do you currently need...    Turning from your back to your side while in flat bed without using bedrails?  4  -AA     Moving from lying on back to sitting on the side of a flat bed without bedrails?  4  -AA     Moving to and from a bed to a chair (including a wheelchair)?  3  -AA     Standing up from a chair using your arms (e.g., wheelchair, bedside chair)?  4  -AA     Climbing 3-5 steps with a railing?  3  -AA     To walk in hospital room?  3  -AA     AM-PAC 6 Clicks Score (PT)  21  -AA     Row Name 11/19/19 1514          Functional Assessment    Outcome Measure Options  AM-PAC 6 Clicks Basic Mobility (PT)  -AA       User Key  (r) = Recorded By, (t) = Taken By, (c) = Cosigned By    Initials Name Provider Type    Rosalia Harvey PT Physical Therapist        Physical Therapy  Education     Title: PT OT SLP Therapies (Done)     Topic: Physical Therapy (Done)     Point: Mobility training (Done)     Learning Progress Summary           Patient Acceptance, E,D, VU,DU,NR by AA at 11/19/2019  3:14 PM   Family Acceptance, E,D, VU,DU,NR by AA at 11/19/2019  3:14 PM                   Point: Home exercise program (Done)     Learning Progress Summary           Patient Acceptance, E,D, VU,DU,NR by AA at 11/19/2019  3:14 PM   Family Acceptance, E,D, VU,DU,NR by AA at 11/19/2019  3:14 PM                   Point: Body mechanics (Done)     Learning Progress Summary           Patient Acceptance, E,D, VU,DU,NR by AA at 11/19/2019  3:14 PM   Family Acceptance, E,D, VU,DU,NR by AA at 11/19/2019  3:14 PM                   Point: Precautions (Done)     Learning Progress Summary           Patient Acceptance, E,D, VU,DU,NR by AA at 11/19/2019  3:14 PM   Family Acceptance, E,D, VU,DU,NR by AA at 11/19/2019  3:14 PM                               User Key     Initials Effective Dates Name Provider Type Discipline     04/02/18 -  Ayo April LAWRENCE, PT Physical Therapist PT              PT Recommendation and Plan  Planned Therapy Interventions (PT Eval): balance training, gait training, home exercise program, patient/family education, postural re-education, stair training, strengthening, transfer training  Outcome Summary/Treatment Plan (PT)  Anticipated Discharge Disposition (PT): home with assist  Plan of Care Reviewed With: patient, spouse  Progress: improving  Outcome Summary: PT eval complete.  Pt presents with 1/10 pain POD#1 L4-5 PLIF.  No pain BLE during evaluation.  Pt ambulated 750 feet with no AD or LOB.  Pt ambulated up and down 2 flights of stairs with SPV and LHR.  HEP reviewed with handout.  Pt plans to DC home with family when appropriate     Time Calculation:   PT Charges     Row Name 11/19/19 4941             Time Calculation    Start Time  1514  -AA      PT Received On  11/19/19  -      PT Goal  Re-Cert Due Date  11/22/19  -PIPPA         Time Calculation- PT    Total Timed Code Minutes- PT  10 minute(s)  -AA         Timed Charges    69794 - Gait Training Minutes   10  -AA        User Key  (r) = Recorded By, (t) = Taken By, (c) = Cosigned By    Initials Name Provider Type    Rosalia Harvey, PT Physical Therapist        Therapy Charges for Today     Code Description Service Date Service Provider Modifiers Qty    42005707420 HC GAIT TRAINING EA 15 MIN 11/19/2019 Rosalia Rollins, PT GP 1    [id number] HC PT EVAL LOW COMPLEXITY 3 11/19/2019 Rosalia Rollins, PT GP 1          PT G-Codes  Outcome Measure Options: AM-PAC 6 Clicks Basic Mobility (PT)  AM-PAC 6 Clicks Score (PT): 21  AM-PAC 6 Clicks Score (OT): 17    Rosalia Rollins PT  11/19/2019

## 2019-11-19 NOTE — PROGRESS NOTES
Discharge Planning Assessment  [facility]     Patient Name: Johnnie Pablo  MRN: [id number]  Today's Date: 11/19/2019    Admit Date: 11/18/2019    Discharge Needs Assessment     Row Name 11/19/19 1443       Living Environment    Lives With  spouse    Current Living Arrangements  home/apartment/condo    Living Arrangement Comments  Has 14 steps from garage to main level of home. Can enter another way and stay on one level of home. Spouse available to assist as needed.       Discharge Needs Assessment    Equipment Currently Used at Home  none    Equipment Needed After Discharge  none    Discharge Coordination/Progress  Has a r walker and bath bench that belonged to his mother he can use. He is not interested in a BSC but will obtain a toilet extension. He is aware the toilet extension is not covered by insurance. Has med coverage thru his insurance.        Discharge Plan     Row Name 11/19/19 1446       Plan    Plan  Home at DC    Patient/Family in Agreement with Plan  yes    Plan Comments  I spoke with the pt. He has done outpt PT at [facility] in the past. If TX needed after DC he would want to do outpt at [facility] again. Denies any other DC needs.    Final Discharge Disposition Code  01 - home or self-care        Destination      No service coordination in this encounter.      Durable Medical Equipment      No service coordination in this encounter.      Dialysis/Infusion      No service coordination in this encounter.      Home Medical Care      No service coordination in this encounter.      Therapy      No service coordination in this encounter.      Community Resources      No service coordination in this encounter.        Expected Discharge Date and Time     Expected Discharge Date Expected Discharge Time    Nov 20, 2019         Demographic Summary    No documentation.       Functional Status     Row Name 11/19/19 1442       Functional Status    Usual Activity Tolerance  good       Functional  Status, IADL    Medications  independent    Meal Preparation  independent    Housekeeping  independent    Laundry  independent    Shopping  independent        Psychosocial    No documentation.       Abuse/Neglect    No documentation.       Legal    No documentation.       Substance Abuse    No documentation.       Patient Forms    No documentation.           Roxi Tadeo RN

## 2019-11-19 NOTE — THERAPY EVALUATION
Acute Care - Occupational Therapy Initial Evaluation  [facility]     Patient Name: Johnnie Pablo  : 1950  MRN: [id number]  Today's Date: 2019  Onset of Illness/Injury or Date of Surgery: 19  Date of Referral to OT: 19  Referring Physician: Kiet    Admit Date: 2019       ICD-10-CM ICD-9-CM   1. Impaired mobility and ADLs Z74.09 799.89   2. Lumbar stenosis with neurogenic claudication M48.062 724.03     Patient Active Problem List   Diagnosis   • Spondylolisthesis of lumbar region   • Lumbar stenosis with neurogenic claudication     Past Medical History:   Diagnosis Date   • Arthritis    • Back pain     left side worse   • Cancer (CMS/HCC)     Skin   • GERD (gastroesophageal reflux disease)    • Skin irritation     see dermatology uses cream     Past Surgical History:   Procedure Laterality Date   • COLONOSCOPY     • GALLBLADDER SURGERY     • HEMORRHOIDECTOMY     • JOINT REPLACEMENT      bilateral partial   • KNEE ARTHROPLASTY, PARTIAL REPLACEMENT Bilateral    • LUMBAR LAMINECTOMY WITH FUSION N/A 2019    Procedure: LUMBAR FUSION DECOMPRESSON WITH PEDICLE SCREWS L4-5;  Surgeon: Farhat Santa MD;  Location: [facility];  Service: Neurosurgery   • SKIN CANCER EXCISION      all over per patient          OT ASSESSMENT FLOWSHEET (last 12 hours)      Occupational Therapy Evaluation     Row Name 19 1025                   OT Evaluation Time/Intention    Subjective Information  no complaints  -TB        Document Type  evaluation  -TB        Mode of Treatment  occupational therapy;individual therapy  -TB        Patient Effort  excellent  -TB        Symptoms Noted During/After Treatment  none  -TB           General Information    Patient Profile Reviewed?  yes  -TB        Onset of Illness/Injury or Date of Surgery  19  -TB        Referring Physician  Kiet KULKARNI        Patient Observations  alert;cooperative;agree to therapy  -TB        Prior Level of Function   independent:;all household mobility;min assist:;ADL's  -TB        Equipment Currently Used at Home  bath bench not using prior  -TB        Pertinent History of Current Functional Problem  s/p L4-5 PLIF  -TB        Existing Precautions/Restrictions  spinal;other (see comments) Hemovac drain  -TB        Risks Reviewed  patient:;LOB;increased discomfort;increased drainage;lines disloged  -TB        Benefits Reviewed  patient:;improve function;increase independence;decrease pain;decrease risk of DVT;increase knowledge  -TB        Barriers to Rehab  previous functional deficit  -TB           Relationship/Environment    Primary Source of Support/Comfort  spouse  -TB        Lives With  spouse  -TB        Family Caregiver if Needed  spouse  -TB        Primary Roles/Responsibilities  retired  -TB           Resource/Environmental Concerns    Current Living Arrangements  home/apartment/condo  -TB           Cognitive Assessment/Intervention- PT/OT    Orientation Status (Cognition)  oriented x 4  -TB        Follows Commands (Cognition)  WNL  -TB        Safety Deficit (Cognitive)  mild deficit;safety precautions awareness;safety precautions follow-through/compliance  -TB        Cognitive Assessment/Intervention Comment  New learning deficits only  -TB           Safety Issues, Functional Mobility    Safety Issues Affecting Function (Mobility)  safety precaution awareness;safety precautions follow-through/compliance  -TB        Impairments Affecting Function (Mobility)  pain  -TB           Bed Mobility Assessment/Treatment    Bed Mobility Assessment/Treatment  rolling right;sidelying-sit;scooting/bridging  -TB        Rolling Right [place] (Bed Mobility)  contact guard;verbal cues  -TB        Scooting/Bridging [place] (Bed Mobility)  supervision  -TB        Sidelying-Sit [place] (Bed Mobility)  contact guard;verbal cues  -TB        Assistive Device (Bed Mobility)  bed rails  -TB        Comment (Bed Mobility)   Education initiated for spinal precautions and logroll sequencing; good teach back  -TB           Functional Mobility    Functional Mobility- Ind. Level  standby assist  -TB        Functional Mobility- Device  rolling walker  -TB        Functional Mobility-Distance (Feet)  700  -TB        Functional Mobility- Comment  good safety, endurance  -TB           Transfer Assessment/Treatment    Transfer Assessment/Treatment  sit-stand transfer;stand-sit transfer;toilet transfer;bed-chair transfer  -TB        Comment (Transfers)  cues for spinal precautions, hand placement and sequencing  -TB           Bed-Chair Transfer    Bed-Chair Reform (Transfers)  stand by assist  -TB           Sit-Stand Transfer    Sit-Stand Reform (Transfers)  stand by assist  -TB           Stand-Sit Transfer    Stand-Sit Reform (Transfers)  stand by assist  -TB           Toilet Transfer    Type (Toilet Transfer)  sit-stand;stand-sit  -TB        Reform Level (Toilet Transfer)  conditional independence  -TB        Assistive Device (Toilet Transfer)  grab bars/safety frame;raised toilet seat  -TB           ADL Assessment/Intervention    98985 - OT Self Care/Mgmt Minutes  20  -TB        BADL Assessment/Intervention  bathing;lower body dressing;toileting  -TB           Bathing Assessment/Intervention    Bathing Reform Level  lower body;bathing skills  -TB        Assistive Devices (Bathing)  long-handled sponge  -TB        Comment (Bathing)  Education/demonstration initiated for spinal precautions with ADLs and use of AE to maximize ADL independence  -TB           Lower Body Dressing Assessment/Training    Lower Body Dressing Reform Level  lower body dressing skills  -TB        Assistive Devices (Lower Body Dressing)  long-handled shoe horn;reacher;sock-aid  -TB        Comment (Lower Body Dressing)  Education/demonstration initiated for spinal precautions with ADLs and use of AE to maximize ADL independence  -TB            Toileting Assessment/Training    [place] Level (Toileting)  adjust/manage clothing;perform perineal hygiene;conditional independence  -TB        Assistive Devices (Toileting)  grab bar/safety frame;raised toilet seat  -TB        Comment (Toileting)  Education completed for spinal precautions with toileting  -TB           BADL Safety/Performance    Impairments, BADL Safety/Performance  pain  -TB        Skilled BADL Treatment/Intervention  BADL process/adaptation training;adaptive equipment training  -TB           General ROM    GENERAL ROM COMMENTS  BUE AROM WFL for ADL; h/o L shoulder injury with slight end range deficits FE/ABduction  -TB           MMT (Manual Muscle Testing)    General MMT Comments  BUE intact, functionally 4+/5  -TB           Motor Assessment/Interventions    Additional Documentation  Balance (Group);Therapeutic Exercise (Group)  -TB           Therapeutic Exercise    Therapeutic Exercise  seated, upper extremities  -TB        Additional Documentation  Therapeutic Exercise (Row)  -TB           Upper Extremity Seated Therapeutic Exercise    Performed, Seated Upper Extremity (Therapeutic Exercise)  shoulder flexion/extension;shoulder abduction/adduction;shoulder external/internal rotation;shoulder horizontal abduction/adduction;elbow flexion/extension;forearm supination/pronation;wrist flexion/extension;digit flexion/extension  -TB        Exercise Type, Seated Upper Extremity (Therapeutic Exercise)  AROM (active range of motion)  -TB        Restrictions, Seated Upper Extremity (Therapeutic Exercise)  h/o L shoulder injury  -TB           Balance    Balance  dynamic sitting balance;dynamic standing balance  -TB           Dynamic Sitting Balance    Level of [place], Reaches Outside Midline (Sitting, Dynamic Balance)  independent  -TB        Sitting Position, Reaches Outside Midline (Sitting, Dynamic Balance)  sitting in chair;sitting on edge of bed  -TB        Comment, Reaches Outside  "Midline (Sitting, Dynamic Balance)  ADL tasks/AE teaching  -TB           Dynamic Standing Balance    Level of [place], Reaches Outside Midline (Standing, Dynamic Balance)  standby assist  -TB        Time Able to Maintain Position, Reaches Outside Midline (Standing, Dynamic Balance)  more than 5 minutes  -TB        Assistive Device Utilized (Supported Standing, Dynamic Balance)  walker, rolling  -TB           Sensory Assessment/Intervention    Sensory General Assessment  no sensation deficits identified BUE intact  -TB           Positioning and Restraints    Pre-Treatment Position  in bed  -TB        Post Treatment Position  chair  -TB        In Chair  notified nsg;reclined;call light within reach;encouraged to call for assist;exit alarm on;legs elevated  -TB           Pain Assessment    Additional Documentation  Pain Scale: Word Pre/Post-Treatment (Group)  -TB           Pain Scale: Numbers Pre/Post-Treatment    Pain Location - Side  Bilateral  -TB        Pain Location - Orientation  lower  -TB        Pain Location  back  -TB        Pain Intervention(s)  Ambulation/increased activity;Repositioned  -TB           Pain Scale: Word Pre/Post-Treatment    Pain: Word Scale, Pretreatment  2 - mild pain  -TB        Pain: Word Scale, Post-Treatment  2 - mild pain  -TB        Pre/Post Treatment Pain Comment  Pt reports 0/10 pain in B hips/LE, \"feel so much better, it'll make you want to cry\"  -TB           Wound 11/18/19 1548 Other (See comments) back Incision    Wound - Properties Group Date first assessed: 11/18/19  - Time first assessed: 1548  -EH Side: Other (See comments)  -EH Location: back  -EH Primary Wound Type: Incision  -EH       Coping    Observed Emotional State  cooperative;hopeful  -TB        Verbalized Emotional State  hopefulness  -TB           Plan of Care Review    Plan of Care Reviewed With  patient  -TB           Clinical Impression (OT)    Date of Referral to OT  11/18/19  -TB        OT Diagnosis  " Impaired mobility and ADL  -TB        Patient/Family Goals Statement (OT Eval)  to regain independence; to be able to golf without pain  -TB        Criteria for Skilled Therapeutic Interventions Met (OT Eval)  yes;treatment indicated  -TB        Rehab Potential (OT Eval)  good, to achieve stated therapy goals  -TB        Therapy Frequency (OT Eval)  daily  -TB        Care Plan Review (OT)  evaluation/treatment results reviewed;care plan/treatment goals reviewed;risks/benefits reviewed;patient/other agree to care plan  -TB        Anticipated Equipment Needs at Discharge (OT)  raised toilet seat  -TB        Patient/Family Concerns, Equipment Needs at Discharge (OT)  Pt requests that CM order ETS for d/c  -TB        Anticipated Discharge Disposition (OT)  home with assist  -TB           Vital Signs    Pre Systolic BP Rehab  -- RN cleared OT  -TB        O2 Delivery Post Treatment  room air  -TB        Pre Patient Position  Supine  -TB        Intra Patient Position  Standing  -TB        Post Patient Position  Sitting  -TB           Planned OT Interventions    Planned Therapy Interventions (OT Eval)  transfer/mobility retraining;occupation/activity based interventions;BADL retraining;adaptive equipment training  -TB           OT Goals    Bed Mobility Goal Selection (OT)  bed mobility, OT goal 1  -TB        Transfer Goal Selection (OT)  transfer, OT goal 1  -TB        Dressing Goal Selection (OT)  dressing, OT goal 1  -TB           Bed Mobility Goal 1 (OT)    Activity/Assistive Device (Bed Mobility Goal 1, OT)  sidelying to sit/sit to sidelying  -TB        [place] Level/Cues Needed (Bed Mobility Goal 1, OT)  supervision required;verbal cues required  -TB        Time Frame (Bed Mobility Goal 1, OT)  by discharge  -TB        Barriers (Bed Mobility Goal 1, OT)  spinal precautions/logroll sequencing  -TB        Progress/Outcomes (Bed Mobility Goal 1, OT)  goal ongoing  -TB           Transfer Goal 1 (OT)     Activity/Assistive Device (Transfer Goal 1, OT)  tub  -TB        [place] Level/Cues Needed (Transfer Goal 1, OT)  contact guard assist;verbal cues required  -TB        Time Frame (Transfer Goal 1, OT)  by discharge  -TB        Barriers (Transfers Goal 1, OT)  spinal precautions  -TB        Progress/Outcome (Transfer Goal 1, OT)  goal ongoing  -TB           Dressing Goal 1 (OT)    Activity/Assistive Device (Dressing Goal 1, OT)  lower body dressing;long handled shoe horn;reacher;sock-aid  -TB        [place]/Cues Needed (Dressing Goal 1, OT)  minimum assist (75% or more patient effort);verbal cues required  -TB        Time Frame (Dressing Goal 1, OT)  by discharge  -TB        Barriers (Dressing Goal 1, OT)  spinal precautions  -TB        Progress/Outcome (Dressing Goal 1, OT)  goal ongoing  -TB           Living Environment    Home Accessibility  tub/shower is not walk in TTB  -TB          User Key  (r) = Recorded By, (t) = Taken By, (c) = Cosigned By    Initials Name Effective Dates    TB Marylin Rinaldi OT 06/08/18 -     Pretty Jones RN 08/17/17 -          Occupational Therapy Education     Title: PT OT SLP Therapies (Done)     Topic: Occupational Therapy (Done)     Point: ADL training (Done)     Description: Instruct learner(s) on proper safety adaptation and remediation techniques during self care or transfers.   Instruct in proper use of assistive devices.    Learning Progress Summary           Patient Acceptance, E,D, VU,NR by TB at 11/19/2019 11:14 AM    Comment:  Education initiated for benefits of OOB activity/therapy, role of OT, spinal precautions, logroll sequencing, use of AE and transfer training.                   Point: Precautions (Done)     Description: Instruct learner(s) on prescribed precautions during self-care and functional transfers.    Learning Progress Summary           Patient Acceptance, E,D, VU,NR by  at 11/19/2019 11:14 AM    Comment:  Education initiated for  benefits of OOB activity/therapy, role of OT, spinal precautions, logroll sequencing, use of AE and transfer training.                               User Key     Initials Effective Dates Name Provider Type Discipline     06/08/18 -  Marylin Rinaldi, OT Occupational Therapist OT                  OT Recommendation and Plan  Outcome Summary/Treatment Plan (OT)  Anticipated Equipment Needs at Discharge (OT): raised toilet seat  Patient/Family Concerns, Equipment Needs at Discharge (OT): Pt requests that CM order ETS for d/c  Anticipated Discharge Disposition (OT): home with assist  Planned Therapy Interventions (OT Eval): transfer/mobility retraining, occupation/activity based interventions, BADL retraining, adaptive equipment training  Therapy Frequency (OT Eval): daily  Plan of Care Review  Plan of Care Reviewed With: patient  Plan of Care Reviewed With: patient  Outcome Summary: OT IE completed. Pt motivated to work with therapy; to regain functional independence. Alert, Ox4 and reports good pain control. CGA to transition to EOB via logroll. SBA transfers and hallway ambulation x700 ft. Education initiated for spinal precautions with ADLs and use of AE. Pt requests CM order toilet seat riser for d/c. OT will follow IP to advance self-care performance. Plan is home with spouse assist at d/c.     Outcome Measures     Row Name 11/19/19 1025             How much help from another is currently needed...    Putting on and taking off regular lower body clothing?  2  -TB      Bathing (including washing, rinsing, and drying)  2  -TB      Toileting (which includes using toilet bed pan or urinal)  3  -TB      Putting on and taking off regular upper body clothing  3  -TB      Taking care of personal grooming (such as brushing teeth)  3  -TB      Eating meals  4  -TB      AM-PAC 6 Clicks Score (OT)  17  -TB         Functional Assessment    Outcome Measure Options  AM-PAC 6 Clicks Daily Activity (OT)  -TB        User Key   (r) = Recorded By, (t) = Taken By, (c) = Cosigned By    Initials Name Provider Type    TB Marylin Rinaldi OT Occupational Therapist          Time Calculation:   Time Calculation- OT     Row Name 11/19/19 1119 11/19/19 1025          Time Calculation- OT    OT Start Time  1025  -TB  --     OT Received On  11/19/19  -TB  --     OT Goal Re-Cert Due Date  11/29/19  -TB  --        Timed Charges    58061 - OT Self Care/Mgmt Minutes  --  20  -TB       User Key  (r) = Recorded By, (t) = Taken By, (c) = Cosigned By    Initials Name Provider Type    TB Marylin Rinaldi OT Occupational Therapist        Therapy Charges for Today     Code Description Service Date Service Provider Modifiers Qty    62006312853 HC OT SELF CARE/MGMT/TRAIN EA 15 MIN 11/19/2019 Marylin Rinaldi OT GO 1    [id number]  OT EVAL LOW COMPLEXITY 3 11/19/2019 Marylin Rinaldi OT GO 1               Marylin Rinaldi OT  11/19/2019

## 2019-11-19 NOTE — PLAN OF CARE
Problem: Patient Care Overview  Goal: Plan of Care Review  Outcome: Ongoing (interventions implemented as appropriate)   11/19/19 1234   Plan of Care Review   Progress improving   OTHER   Outcome Summary PT eval complete. Pt presents with 1/10 pain POD#1 L4-5 PLIF. No pain BLE during evaluation. Pt ambulated 750 feet with no AD or LOB. Pt ambulated up and down 2 flights of stairs with SPV and LHR. HEP reviewed with handout. Pt plans to DC home with family when appropriate   Coping/Psychosocial   Plan of Care Reviewed With patient;spouse

## 2019-11-19 NOTE — PLAN OF CARE
Problem: Patient Care Overview  Goal: Plan of Care Review  Outcome: Ongoing (interventions implemented as appropriate)   11/19/19 9966   Plan of Care Review   Progress improving   OTHER   Outcome Summary Pt came from PACU at 1945. Dressing CDI. Hemovac output is 225. Walked 600ft last night. Pain controlled wit PO pain medication.        Problem: Laminectomy/Foraminotomy/Discectomy (Adult)  Goal: Signs and Symptoms of Listed Potential Problems Will be Absent, Minimized or Managed (Laminectomy/Foraminotomy/Discectomy)  Outcome: Ongoing (interventions implemented as appropriate)      Problem: Pain, Acute (Adult)  Goal: Identify Related Risk Factors and Signs and Symptoms  Outcome: Ongoing (interventions implemented as appropriate)    Goal: Acceptable Pain Control/Comfort Level  Outcome: Ongoing (interventions implemented as appropriate)

## 2019-11-19 NOTE — PROGRESS NOTES
"NEUROSURGERY PROGRESS NOTE     LOS: 1 day   Patient Care Team:  Gallito Root MD as PCP - General (General Practice)  Gallito Root MD as Referring Physician (Internal Medicine)    Chief Complaint: Back and leg pain with walking and standing intolerance.    POD#: 1 Day Post-Op  Procedures:  L4-5 PLIF.    Interval History:   Patient is voiding independently and ambulated in the pascal last evening.  Patient Complaints: Incisional pain.  Patient Denies: Preoperative claudication symptoms.    Vital Signs: Blood pressure 98/56, pulse 61, temperature 98 °F (36.7 °C), temperature source Oral, resp. rate 16, height 175.3 cm (69\"), weight 98 kg (216 lb 0.8 oz), SpO2 97 %.  Intake/Output:     Intake/Output Summary (Last 24 hours) at 11/19/2019 0638  Last data filed at 11/19/2019 0520  Gross per 24 hour   Intake 1000 ml   Output 1000 ml   Net 0 ml     Drain output: 225 mL.    Physical Exam:  Patient awakens easily.  He is well oriented.  He appears reasonably comfortable.  Dry dressing is in place on his incision.  Motor function is intact in his lower extremities.     Data Review:    Results from last 7 days   Lab Units 11/19/19  0400   HEMOGLOBIN g/dL 12.9*   HEMATOCRIT % 40.6         Assessment/Plan:  1.  L4-5 spondylolisthesis, stenosis, and instability status post decompression, fusion, stabilization.  2.  History of tobacco abuse.  3.  Disposition: Mobilize patient.  I anticipate that he will be discharged home in the next day or 2.      Farhat Santa MD  11/19/19  6:38 AM    "

## 2019-11-19 NOTE — PLAN OF CARE
Problem: Patient Care Overview  Goal: Plan of Care Review  Outcome: Ongoing (interventions implemented as appropriate)   11/19/19 1831   Plan of Care Review   Progress improving   OTHER   Outcome Summary pain controlled dressing CDI vitals WNL room air ambulates in hallway self hemovac has minimum out BM 11/18 voids with no issues   11/19/19 1831   Plan of Care Review   Progress improving   OTHER   Outcome Summary pain controlled dressing CDI vitals WNL room air ambulates in hallway self hemovac has minimum out BM 11/18    Coping/Psychosocial   Plan of Care Reviewed With patient      Coping/Psychosocial   Plan of Care Reviewed With patient

## 2019-11-19 NOTE — PLAN OF CARE
Problem: Pain, Acute (Adult)  Goal: Acceptable Pain Control/Comfort Level  Outcome: Ongoing (interventions implemented as appropriate)   11/19/19 1522   Pain, Acute (Adult)   Acceptable Pain Control/Comfort Level making progress toward outcome

## 2019-11-20 ENCOUNTER — TELEPHONE (OUTPATIENT)
Dept: NEUROSURGERY | Facility: CLINIC | Age: 69
End: 2019-11-20

## 2019-11-20 VITALS
BODY MASS INDEX: 32 KG/M2 | TEMPERATURE: 97.9 F | RESPIRATION RATE: 18 BRPM | DIASTOLIC BLOOD PRESSURE: 70 MMHG | SYSTOLIC BLOOD PRESSURE: 107 MMHG | WEIGHT: 216.05 LBS | HEART RATE: 65 BPM | OXYGEN SATURATION: 96 % | HEIGHT: 69 IN

## 2019-11-20 DIAGNOSIS — Z98.1 S/P LUMBAR FUSION: Primary | ICD-10-CM

## 2019-11-20 PROCEDURE — 97116 GAIT TRAINING THERAPY: CPT

## 2019-11-20 PROCEDURE — 97110 THERAPEUTIC EXERCISES: CPT

## 2019-11-20 PROCEDURE — 99024 POSTOP FOLLOW-UP VISIT: CPT | Performed by: NEUROLOGICAL SURGERY

## 2019-11-20 PROCEDURE — 63710000001 DIPHENHYDRAMINE PER 50 MG: Performed by: NEUROLOGICAL SURGERY

## 2019-11-20 RX ORDER — OXYCODONE HYDROCHLORIDE 15 MG/1
15 TABLET, FILM COATED, EXTENDED RELEASE ORAL EVERY 12 HOURS SCHEDULED
Qty: 8 TABLET | Refills: 0 | Status: SHIPPED | OUTPATIENT
Start: 2019-11-20

## 2019-11-20 RX ORDER — NICOTINE 21 MG/24HR
1 PATCH, TRANSDERMAL 24 HOURS TRANSDERMAL
Qty: 21 PATCH | Refills: 1 | Status: SHIPPED | OUTPATIENT
Start: 2019-11-20

## 2019-11-20 RX ORDER — OXYCODONE HYDROCHLORIDE AND ACETAMINOPHEN 5; 325 MG/1; MG/1
1 TABLET ORAL 3 TIMES DAILY PRN
Qty: 30 TABLET | Refills: 0 | Status: SHIPPED | OUTPATIENT
Start: 2019-11-20

## 2019-11-20 RX ORDER — IBUPROFEN 600 MG/1
600 TABLET ORAL 3 TIMES DAILY
Qty: 45 TABLET | Refills: 0 | Status: SHIPPED | OUTPATIENT
Start: 2019-11-20

## 2019-11-20 RX ADMIN — ACETAMINOPHEN 650 MG: 325 TABLET ORAL at 09:57

## 2019-11-20 RX ADMIN — DOCUSATE SODIUM 100 MG: 100 CAPSULE, LIQUID FILLED ORAL at 00:47

## 2019-11-20 RX ADMIN — DIPHENHYDRAMINE HYDROCHLORIDE 25 MG: 25 CAPSULE ORAL at 00:47

## 2019-11-20 RX ADMIN — IBUPROFEN 600 MG: 600 TABLET ORAL at 09:57

## 2019-11-20 RX ADMIN — OXYCODONE HYDROCHLORIDE 15 MG: 15 TABLET, FILM COATED, EXTENDED RELEASE ORAL at 06:16

## 2019-11-20 RX ADMIN — SODIUM CHLORIDE, PRESERVATIVE FREE 3 ML: 5 INJECTION INTRAVENOUS at 09:45

## 2019-11-20 RX ADMIN — OXYCODONE HYDROCHLORIDE 15 MG: 15 TABLET, FILM COATED, EXTENDED RELEASE ORAL at 13:47

## 2019-11-20 NOTE — PLAN OF CARE
Problem: Patient Care Overview  Goal: Plan of Care Review  Outcome: Ongoing (interventions implemented as appropriate)   11/20/19 1973   OTHER   Outcome Summary Pt ambulated 750 feet with conditional independence and no AD. Pt also ascended/descended 24 steps with L ascending handrail and conditional independence. Reviewed HEP and spinal precautions. Pt progressing very well with mobility and anticipate he will d/c home with assist today.    Coping/Psychosocial   Plan of Care Reviewed With patient

## 2019-11-20 NOTE — THERAPY DISCHARGE NOTE
Patient Name: Johnnie Pablo  : 1950    MRN: [id number]                              Today's Date: 2019       Admit Date: 2019    Visit Dx:     ICD-10-CM ICD-9-CM   1. Impaired mobility and ADLs Z74.09 799.89   2. Lumbar stenosis with neurogenic claudication M48.062 724.03     Patient Active Problem List   Diagnosis   • Spondylolisthesis of lumbar region   • Lumbar stenosis with neurogenic claudication     Past Medical History:   Diagnosis Date   • Arthritis    • Back pain     left side worse   • Cancer (CMS/HCC)     Skin   • GERD (gastroesophageal reflux disease)    • Skin irritation     see dermatology uses cream     Past Surgical History:   Procedure Laterality Date   • COLONOSCOPY     • GALLBLADDER SURGERY     • HEMORRHOIDECTOMY     • JOINT REPLACEMENT      bilateral partial   • KNEE ARTHROPLASTY, PARTIAL REPLACEMENT Bilateral    • LUMBAR LAMINECTOMY WITH FUSION N/A 2019    Procedure: LUMBAR FUSION DECOMPRESSON WITH PEDICLE SCREWS L4-5;  Surgeon: Farhat Santa MD;  Location: [facility];  Service: Neurosurgery   • SKIN CANCER EXCISION      all over per patient     General Information     Row Name 19 0925          PT Evaluation Time/Intention    Document Type  discharge treatment  -COSME     Mode of Treatment  physical therapy  -COSME     Row Name 19 0925          General Information    Patient Profile Reviewed?  yes  -COSME     Existing Precautions/Restrictions  spinal;other (see comments) hemovac  -COSME     Row Name 1925          Cognitive Assessment/Intervention- PT/OT    Orientation Status (Cognition)  oriented x 4  -COSME     Row Name 19 0925          Safety Issues, Functional Mobility    Safety Issues Affecting Function (Mobility)  safety precaution awareness;safety precautions follow-through/compliance  -COSME     Impairments Affecting Function (Mobility)  pain  -COSME       User Key  (r) = Recorded By, (t) = Taken By, (c) = Cosigned By    Initials Name Provider  Type    COSME Elio Castillo, PT Physical Therapist        Mobility     Row Name 11/20/19 0925          Bed Mobility Assessment/Treatment    Bed Mobility Assessment/Treatment  supine-sit;rolling left  -COSME     Rolling Left [place] (Bed Mobility)  verbal cues;supervision  -COSME     Scooting/Bridging Chase (Bed Mobility)  supervision;verbal cues  -COSME     Supine-Sit [place] (Bed Mobility)  verbal cues;supervision  -COSME     Comment (Bed Mobility)  Verbal cues for use of logroll technique to sit EOB  -COSME     Row Name 11/20/19 0925          Transfer Assessment/Treatment    Comment (Transfers)  Verbal cues for maintaining spinal precautions  -COSME     Row Name 11/20/19 0925          Sit-Stand Transfer    Sit-Stand [place] (Transfers)  conditional independence  -COSME     Assistive Device (Sit-Stand Transfers)  other (see comments) none; gait belt  -COSME     Row Name 11/20/19 0925          Gait/Stairs Assessment/Training    Gait/Stairs Assessment/Training  gait/ambulation independence  -COSME     [place] Level (Gait)  conditional independence  -COSME     Assistive Device (Gait)  other (see comments) none; gait belt  -COSME     Distance in Feet (Gait)  750 feet  -COSME     Pattern (Gait)  swing-through  -COSME     Deviations/Abnormal Patterns (Gait)  bilateral deviations;stride length decreased;kaden decreased;gait speed decreased  -COSME     Negotiation (Stairs)  stairs independence  -COSME     [place] Level (Stairs)  conditional independence  -COSME     Handrail Location (Stairs)  left side (ascending)  -COSME     Number of Steps (Stairs)  24  -COSME     Ascending Technique (Stairs)  step-over-step  -COSME     Descending Technique (Stairs)  step-over-step  -COSME     Comment (Gait/Stairs)  Pt ambulated with swing-through pattern and decreased speed. Verbal cues for spinal precautions throughout. Pt ascended/descended 24 steps with L ascending handrail and conditional independence. Gait/stair training limited by fatigue  -COSME       User Key   (r) = Recorded By, (t) = Taken By, (c) = Cosigned By    Initials Name Provider Type    Elio Winter PT Physical Therapist        Obj/Interventions     [facility] Name 11/20/19 0925          Therapeutic Exercise    Upper Extremity Range of Motion (Therapeutic Exercise)  shoulder flexion/extension, bilateral  -COSME     Lower Extremity (Therapeutic Exercise)  gluteal sets;heel slides, bilateral;quad sets, bilateral  -COSME     Lower Extremity Range of Motion (Therapeutic Exercise)  ankle dorsiflexion/plantar flexion, bilateral;hip internal/external rotation, bilateral  -COSME     Core Strength (Therapeutic Exercise)  abdominal bracing BKFO, arm flexion, ab sets  -COSME     Exercise Type (Therapeutic Exercise)  AROM (active range of motion);isometric contraction, static  -COSME     Position (Therapeutic Exercise)  supine  -COSME     Sets/Reps (Therapeutic Exercise)  15x each  -COSME     Comment (Therapeutic Exercise)  Cues for technique  -COSME       User Key  (r) = Recorded By, (t) = Taken By, (c) = Cosigned By    Initials Name Provider Type    Elio Winter PT Physical Therapist        Goals/Plan     Row Name 11/20/19 0925          Transfer Goal 1 (PT)    Activity/Assistive Device (Transfer Goal 1, PT)  sit-to-stand/stand-to-sit;bed-to-chair/chair-to-bed  -COSME     [place] Level/Cues Needed (Transfer Goal 1, PT)  independent  -COSME     Time Frame (Transfer Goal 1, PT)  3 days  -COSME     Progress/Outcome (Transfer Goal 1, PT)  goal partially met;discharged from facility  -[facility] Name 11/20/19 0925          Gait Training Goal 1 (PT)    Activity/Assistive Device (Gait Training Goal 1, PT)  gait (walking locomotion)  -COSME     [place] Level (Gait Training Goal 1, PT)  independent  -COSME     Distance (Gait Goal 1, PT)  1000 feet  -COSME     Time Frame (Gait Training Goal 1, PT)  3 days  -COSME     Progress/Outcome (Gait Training Goal 1, PT)  goal not met;discharged from facility  -COSME     Row Name 11/20/19 0925          Stairs Goal 1 (PT)     Activity/Assistive Device (Stairs Goal 1, PT)  stairs, all skills  -COSME     [place] Level/Cues Needed (Stairs Goal 1, PT)  independent  -COSME     Number of Stairs (Stairs Goal 1, PT)  12  -COSME     Time Frame (Stairs Goal 1, PT)  3 days  -COSME     Progress/Outcome (Stairs Goal 1, PT)  goal partially met;discharged from facility  -     Row Name 11/20/19 0925          Patient Education Goal (PT)    Activity (Patient Education Goal, PT)  HEP  -COSME     [place]/Cues/Accuracy (Memory Goal 2, PT)  demonstrates adequately;independent  -COSME     Time Frame (Patient Education Goal, PT)  3 days  -COSME     Progress/Outcome (Patient Education Goal, PT)  goal met  -COSME       User Key  (r) = Recorded By, (t) = Taken By, (c) = Cosigned By    Initials Name Provider Type    Elio Winter, PT Physical Therapist        Clinical Impression     Row Name 11/20/19 0925          Pain Assessment    Additional Documentation  Pain Scale: Numbers Pre/Post-Treatment (Group)  -     Row Name 11/20/19 0925          Pain Scale: Numbers Pre/Post-Treatment    Pain Scale: Numbers, Pretreatment  6/10  -COSME     Pain Scale: Numbers, Post-Treatment  2/10  -COSME     Pain Location - Side  Bilateral  -COSME     Pain Location - Orientation  incisional  -COSME     Pain Location  back  -COSME     Pain Intervention(s)  Repositioned;Ambulation/increased activity  -     Row Name 11/20/19 0925          Plan of Care Review    Plan of Care Reviewed With  patient  -     Row Name 11/20/19 0955          Physical Therapy Clinical Impression    Patient/Family Goals Statement (PT Clinical Impression)  To return home  -     Criteria for Skilled Interventions Met (PT Clinical Impression)  yes;treatment indicated  -COSME     Rehab Potential (PT Clinical Summary)  good, to achieve stated therapy goals  -     Row Name 11/20/19 0903          Positioning and Restraints    Pre-Treatment Position  in bed  -COSME     Post Treatment Position  chair  -COSME     In Chair  notified  nsg;reclined;encouraged to call for assist;call light within reach;legs elevated  -       User Key  (r) = Recorded By, (t) = Taken By, (c) = Cosigned By    Initials Name Provider Type    Elio Winter PT Physical Therapist        Outcome Measures     Row Name 11/20/19 0925          How much help from another person do you currently need...    Turning from your back to your side while in flat bed without using bedrails?  4  -COSME     Moving from lying on back to sitting on the side of a flat bed without bedrails?  4  -COSME     Moving to and from a bed to a chair (including a wheelchair)?  4  -COSME     Standing up from a chair using your arms (e.g., wheelchair, bedside chair)?  4  -COSME     Climbing 3-5 steps with a railing?  3  -COSME     To walk in hospital room?  4  -COSME     AM-PAC 6 Clicks Score (PT)  23  -     Row Name 11/20/19 0925          Functional Assessment    Outcome Measure Options  AM-PAC 6 Clicks Basic Mobility (PT)  -       User Key  (r) = Recorded By, (t) = Taken By, (c) = Cosigned By    Initials Name Provider Type    Elio Winter, PT Physical Therapist        Physical Therapy Education     Title: PT OT SLP Therapies (Done)     Topic: Physical Therapy (Done)     Point: Mobility training (Done)     Learning Progress Summary           Patient Acceptance, E,D,H, VU by COSME at 11/20/2019  9:25 AM    Comment:  Reviewed HEP and spinal precautions. Educated pt on safe sequencing with bed mobility, ambulatory and car transfers, gait, and stair training. Pt given a handout with HEP and precautions. Reviewed POC with pending d/c.    Acceptance, E,D, VU,DU,NR by AA at 11/19/2019  3:14 PM   Family Acceptance, E,D, VU,DU,NR by AA at 11/19/2019  3:14 PM                   Point: Home exercise program (Done)     Learning Progress Summary           Patient Acceptance, E,D,H, VU by COSME at 11/20/2019  9:25 AM    Comment:  Reviewed HEP and spinal precautions. Educated pt on safe sequencing with bed mobility, ambulatory and car  transfers, gait, and stair training. Pt given a handout with HEP and precautions. Reviewed POC with pending d/c.    Acceptance, E,D, VU,DU,NR by AA at 11/19/2019  3:14 PM   Family Acceptance, E,D, VU,DU,NR by AA at 11/19/2019  3:14 PM                   Point: Body mechanics (Done)     Learning Progress Summary           Patient Acceptance, E,D,H, VU by COSME at 11/20/2019  9:25 AM    Comment:  Reviewed HEP and spinal precautions. Educated pt on safe sequencing with bed mobility, ambulatory and car transfers, gait, and stair training. Pt given a handout with HEP and precautions. Reviewed POC with pending d/c.    Acceptance, E,D, VU,DU,NR by AA at 11/19/2019  3:14 PM   Family Acceptance, E,D, VU,DU,NR by AA at 11/19/2019  3:14 PM                   Point: Precautions (Done)     Learning Progress Summary           Patient Acceptance, E,D,H, VU by COSME at 11/20/2019  9:25 AM    Comment:  Reviewed HEP and spinal precautions. Educated pt on safe sequencing with bed mobility, ambulatory and car transfers, gait, and stair training. Pt given a handout with HEP and precautions. Reviewed POC with pending d/c.    Acceptance, E,D, VU,DU,NR by AA at 11/19/2019  3:14 PM   Family Acceptance, E,D, VU,DU,NR by AA at 11/19/2019  3:14 PM                               User Key     Initials Effective Dates Name Provider Type Discipline     04/02/18 -  Rosalia Rollins, PT Physical Therapist PT    COSME 09/10/19 -  Elio Castillo PT Physical Therapist PT              PT Recommendation and Plan  Planned Therapy Interventions (PT Eval): balance training, bed mobility training, gait training, home exercise program, lumbar stabilization, strengthening, stair training, transfer training  Outcome Summary/Treatment Plan (PT)  Anticipated Discharge Disposition (PT): home with assist  Plan of Care Reviewed With: patient  Outcome Summary: Pt ambulated 750 feet with conditional independence and no AD. Pt also ascended/descended 24 steps with L ascending  handrail and conditional independence. Reviewed HEP and spinal precautions. Pt progressing very well with mobility and anticipate he will d/c home with assist today.      Time Calculation:   PT Charges     Row Name 11/20/19 0925             Time Calculation    Start Time  0925  -      PT Received On  11/20/19  -COSME      PT Goal Re-Cert Due Date  11/22/19  -         Time Calculation- PT    Total Timed Code Minutes- PT  25 minute(s)  -COSME         Timed Charges    52236 - PT Therapeutic Exercise Minutes  10  -COSME      06771 - Gait Training Minutes   15  -COSME        User Key  (r) = Recorded By, (t) = Taken By, (c) = Cosigned By    Initials Name Provider Type    Elio Winter, PT Physical Therapist        Therapy Charges for Today     Code Description Service Date Service Provider Modifiers Qty    86044798214 HC PT THER PROC EA 15 MIN 11/20/2019 Elio Castillo, PT GP 1    10710067854 HC GAIT TRAINING EA 15 MIN 11/20/2019 Elio Castillo, PT GP 1          PT G-Codes  Outcome Measure Options: AM-PAC 6 Clicks Basic Mobility (PT)  AM-PAC 6 Clicks Score (PT): 23  AM-PAC 6 Clicks Score (OT): 17    PT Discharge Summary  Anticipated Discharge Disposition (PT): home with assist  Reason for Discharge: Discharge from facility  Outcomes Achieved: Patient able to partially acheive established goals  Discharge Destination: Home with assist    Elio Castillo PT  11/20/2019

## 2019-11-20 NOTE — TELEPHONE ENCOUNTER
Nurse from the hospital brought RX for Percocet and Oxycontin over to the office to be signed.    RX signed by Dr. Santa and returned to the nurse.

## 2019-11-20 NOTE — DISCHARGE SUMMARY
[facility]    Date of Admission: 11/18/2019  Date of Discharge:  11/20/2019    Discharge Diagnosis:   1. Spondylolisthesis L4-5    Procedures Performed  Procedure(s):  LUMBAR FUSION DECOMPRESSON WITH PEDICLE SCREWS L4-5       Presenting Problem/History of Present Illness  Lumbar stenosis with neurogenic claudication [M48.062]  Lumbar stenosis with neurogenic claudication [M48.062]     Hospital Course  Patient is a 69 y.o. male presented with back and leg pain. W/U revealed lumbar stenosis and spondylolisthesis with instability causing neurogenic claudication and back pain. He failed conservative treatment and admitted for surgery. He had an uncomplicated lumbar decompression and fusion at L4-5 on 11/18/19 and did well with ambulation, voiding, pain control and po intake. His wound was dry and flat.  He was discharged on the 2nd day post op in stable condition.    Condition on Discharge:  satisfactory    Discharge Disposition  Home or Self Care    Discharge Medications     Discharge Medications      New Medications      Instructions Start Date   ibuprofen 600 MG tablet  Commonly known as:  ADVIL,MOTRIN   600 mg, Oral, 3 Times Daily      nicotine 14 MG/24HR patch  Commonly known as:  NICODERM CQ   1 patch, Transdermal, Every 24 Hours Scheduled      oxyCODONE ER 15 MG tablet extended-release 12 hour  Commonly known as:  oxyCONTIN   15 mg, Oral, Every 12 Hours Scheduled         Changes to Medications      Instructions Start Date   oxyCODONE-acetaminophen 5-325 MG per tablet  Commonly known as:  PERCOCET  What changed:    · how much to take  · when to take this  · reasons to take this  · additional instructions   1 tablet, Oral, 3 Times Daily PRN, As needed for breakthrough pain         Continue These Medications      Instructions Start Date   famotidine 20 MG tablet  Commonly known as:  PEPCID   20 mg, Oral, 2 Times Daily PRN      fluocinonide 0.05 %  cream  Commonly known as:  LIDEX   1 application, Topical, 2 Times Daily         Stop These Medications    sulindac 200 MG tablet  Commonly known as:  CLINORIL            Follow-up Appointments  Future Appointments   Date Time Provider Department Center   12/11/2019  9:30 AM Ursula Sepulveda PA-C [facility] None     Additional Instructions for the Follow-ups that You Need to Schedule     Discharge Follow-up with Specified Provider: Kiet; 3 Weeks   As directed      To:  Kiet    Follow Up:  3 Weeks    Follow Up Details:  Follow-up with physician's assistant in 3 weeks with AP and lateral lumbar spine x-rays               Referring Provider  MD BRYSON Martinez William E, MD Debbie A. Croucher, PA-C  11/20/19  4:24 PM

## 2019-11-20 NOTE — TELEPHONE ENCOUNTER
Please pend order for AP and lateral lumbar spine x-rays.  He will be made aware to get xrays prior to appointment with Apro.

## 2019-11-20 NOTE — PROGRESS NOTES
"NEUROSURGERY PROGRESS NOTE     LOS: 2 days   Patient Care Team:  Gallito Root MD as PCP - General (General Practice)  Gallito Root MD as Referring Physician (Internal Medicine)    Chief Complaint: Back and leg pain with walking and standing intolerance.    POD#: 2 Days Post-Op  Procedures:  L4-5 PLIF.    Interval History:   The patient is voiding and ambulatory.  Patient Complaints: Mild incisional pain.  Patient Denies: Preoperative claudication symptoms.    Vital Signs: Blood pressure 103/71, pulse 61, temperature 97.8 °F (36.6 °C), temperature source Oral, resp. rate 18, height 175.3 cm (69\"), weight 98 kg (216 lb 0.8 oz), SpO2 96 %.  Intake/Output:     Intake/Output Summary (Last 24 hours) at 11/20/2019 0643  Last data filed at 11/20/2019 0150  Gross per 24 hour   Intake --   Output 400 ml   Net -400 ml     Drain output: 175/75 mL.    Physical Exam:  Patient awakens easily and follows all commands.  Motor and sensory function are intact in his lower extremities.  Dry dressing is in place on his incision.     Assessment/Plan:  1.  L4-5 spondylolisthesis, stenosis, and instability status post decompression, fusion, stabilization.  2.  History of tobacco abuse.  3.  Disposition: Mobilize patient.  Drain out this afternoon and then home thereafter.  The patient will follow-up in our clinic in approximately 3 weeks.      Farhat Santa MD  11/20/19  6:43 AM    "

## 2019-11-20 NOTE — PLAN OF CARE
Problem: Patient Care Overview  Goal: Plan of Care Review  Outcome: Ongoing (interventions implemented as appropriate)   11/20/19 0500   Plan of Care Review   Progress improving   OTHER   Outcome Summary received report from previous shift. pt AAO, VSS and in no apparent distress or discomfort. pt rested/slept >6 hrs this shift. pain controlled with PO medication to maintain 4/10.   Coping/Psychosocial   Plan of Care Reviewed With patient       Problem: Skin Injury Risk (Adult)  Goal: Identify Related Risk Factors and Signs and Symptoms  Outcome: Ongoing (interventions implemented as appropriate)    Goal: Skin Health and Integrity  Outcome: Ongoing (interventions implemented as appropriate)      Problem: Fall Risk (Adult)  Goal: Identify Related Risk Factors and Signs and Symptoms  Outcome: Ongoing (interventions implemented as appropriate)    Goal: Absence of Fall  Outcome: Ongoing (interventions implemented as appropriate)

## 2019-11-22 ENCOUNTER — NURSE TRIAGE (OUTPATIENT)
Dept: CALL CENTER | Facility: HOSPITAL | Age: 69
End: 2019-11-22

## 2019-11-22 NOTE — TELEPHONE ENCOUNTER
"AVS reviewed with caller and questions answered.    Reason for Disposition  • Health Information question, no triage required and triager able to answer question    Additional Information  • Negative: [1] Caller is not with the adult (patient) AND [2] reporting urgent symptoms  • Negative: Lab result questions  • Negative: Medication questions  • Negative: Caller cannot be reached by phone  • Negative: Caller has already spoken to PCP or another triager  • Negative: RN needs further essential information from caller in order to complete triage  • Negative: Requesting regular office appointment  • Negative: [1] Caller requesting NON-URGENT health information AND [2] PCP's office is the best resource  • Negative: General information question, no triage required and triager able to answer question  • Negative: Question about upcoming scheduled test, no triage required and triager able to answer question    Answer Assessment - Initial Assessment Questions  1. REASON FOR CALL or QUESTION: \"What is your reason for calling today?\" or \"How can I best help you?\" or \"What question do you have that I can help answer?\"      I have some questions about my discharge instructions.    Protocols used: INFORMATION ONLY CALL-ADULT-      "

## 2019-11-23 ENCOUNTER — HOSPITAL ENCOUNTER (EMERGENCY)
Facility: HOSPITAL | Age: 69
Discharge: HOME | End: 2019-11-23
Attending: EMERGENCY MEDICINE | Admitting: EMERGENCY MEDICINE

## 2019-11-23 ENCOUNTER — NURSE TRIAGE (OUTPATIENT)
Dept: CALL CENTER | Facility: HOSPITAL | Age: 69
End: 2019-11-23

## 2019-11-23 VITALS
SYSTOLIC BLOOD PRESSURE: 113 MMHG | HEART RATE: 74 BPM | BODY MASS INDEX: 31.99 KG/M2 | OXYGEN SATURATION: 91 % | WEIGHT: 216 LBS | TEMPERATURE: 98.4 F | RESPIRATION RATE: 18 BRPM | DIASTOLIC BLOOD PRESSURE: 74 MMHG | HEIGHT: 69 IN

## 2019-11-23 DIAGNOSIS — T81.89XA PROBLEM INVOLVING SURGICAL INCISION: Primary | ICD-10-CM

## 2019-11-23 DIAGNOSIS — Z98.1 HISTORY OF LUMBAR FUSION: ICD-10-CM

## 2019-11-23 DIAGNOSIS — Z72.0 TOBACCO ABUSE: ICD-10-CM

## 2019-11-23 DIAGNOSIS — Z87.19 HISTORY OF GASTROESOPHAGEAL REFLUX (GERD): ICD-10-CM

## 2019-11-23 LAB
ALBUMIN SERPL-MCNC: 3.7 G/DL (ref 3.5–5.2)
ALBUMIN/GLOB SERPL: 1.4 G/DL
ALP SERPL-CCNC: 67 U/L (ref 39–117)
ALT SERPL W P-5'-P-CCNC: 21 U/L (ref 1–41)
ANION GAP SERPL CALCULATED.3IONS-SCNC: 10 MMOL/L (ref 5–15)
AST SERPL-CCNC: 14 U/L (ref 1–40)
BASOPHILS # BLD AUTO: 0.07 10*3/MM3 (ref 0–0.2)
BASOPHILS NFR BLD AUTO: 0.8 % (ref 0–1.5)
BILIRUB SERPL-MCNC: 0.3 MG/DL (ref 0.2–1.2)
BUN BLD-MCNC: 17 MG/DL (ref 8–23)
BUN/CREAT SERPL: 18.5 (ref 7–25)
CALCIUM SPEC-SCNC: 9 MG/DL (ref 8.6–10.5)
CHLORIDE SERPL-SCNC: 102 MMOL/L (ref 98–107)
CO2 SERPL-SCNC: 25 MMOL/L (ref 22–29)
CREAT BLD-MCNC: 0.92 MG/DL (ref 0.76–1.27)
CRP SERPL-MCNC: 2.65 MG/DL (ref 0–0.5)
DEPRECATED RDW RBC AUTO: 43.8 FL (ref 37–54)
EOSINOPHIL # BLD AUTO: 0.45 10*3/MM3 (ref 0–0.4)
EOSINOPHIL NFR BLD AUTO: 5.1 % (ref 0.3–6.2)
ERYTHROCYTE [DISTWIDTH] IN BLOOD BY AUTOMATED COUNT: 13.5 % (ref 12.3–15.4)
ERYTHROCYTE [SEDIMENTATION RATE] IN BLOOD: 37 MM/HR (ref 0–20)
GFR SERPL CREATININE-BSD FRML MDRD: 82 ML/MIN/1.73
GLOBULIN UR ELPH-MCNC: 2.7 GM/DL
GLUCOSE BLD-MCNC: 109 MG/DL (ref 65–99)
HCT VFR BLD AUTO: 41.6 % (ref 37.5–51)
HGB BLD-MCNC: 13.4 G/DL (ref 13–17.7)
IMM GRANULOCYTES # BLD AUTO: 0.06 10*3/MM3 (ref 0–0.05)
IMM GRANULOCYTES NFR BLD AUTO: 0.7 % (ref 0–0.5)
LYMPHOCYTES # BLD AUTO: 2.61 10*3/MM3 (ref 0.7–3.1)
LYMPHOCYTES NFR BLD AUTO: 29.6 % (ref 19.6–45.3)
MCH RBC QN AUTO: 28.3 PG (ref 26.6–33)
MCHC RBC AUTO-ENTMCNC: 32.2 G/DL (ref 31.5–35.7)
MCV RBC AUTO: 87.9 FL (ref 79–97)
MONOCYTES # BLD AUTO: 0.75 10*3/MM3 (ref 0.1–0.9)
MONOCYTES NFR BLD AUTO: 8.5 % (ref 5–12)
NEUTROPHILS # BLD AUTO: 4.89 10*3/MM3 (ref 1.7–7)
NEUTROPHILS NFR BLD AUTO: 55.3 % (ref 42.7–76)
NRBC BLD AUTO-RTO: 0 /100 WBC (ref 0–0.2)
PLATELET # BLD AUTO: 154 10*3/MM3 (ref 140–450)
PMV BLD AUTO: 12.1 FL (ref 6–12)
POTASSIUM BLD-SCNC: 4.5 MMOL/L (ref 3.5–5.2)
PROT SERPL-MCNC: 6.4 G/DL (ref 6–8.5)
RBC # BLD AUTO: 4.73 10*6/MM3 (ref 4.14–5.8)
SODIUM BLD-SCNC: 137 MMOL/L (ref 136–145)
WBC NRBC COR # BLD: 8.83 10*3/MM3 (ref 3.4–10.8)

## 2019-11-23 PROCEDURE — 85652 RBC SED RATE AUTOMATED: CPT | Performed by: EMERGENCY MEDICINE

## 2019-11-23 PROCEDURE — 86140 C-REACTIVE PROTEIN: CPT | Performed by: PHYSICIAN ASSISTANT

## 2019-11-23 PROCEDURE — 99283 EMERGENCY DEPT VISIT LOW MDM: CPT

## 2019-11-23 PROCEDURE — 80053 COMPREHEN METABOLIC PANEL: CPT | Performed by: PHYSICIAN ASSISTANT

## 2019-11-23 PROCEDURE — 85025 COMPLETE CBC W/AUTO DIFF WBC: CPT | Performed by: PHYSICIAN ASSISTANT

## 2019-11-23 RX ORDER — ONDANSETRON 4 MG/1
4 TABLET, ORALLY DISINTEGRATING ORAL ONCE
Status: COMPLETED | OUTPATIENT
Start: 2019-11-23 | End: 2019-11-23

## 2019-11-23 RX ADMIN — ONDANSETRON 4 MG: 4 TABLET, ORALLY DISINTEGRATING ORAL at 21:16

## 2019-11-23 NOTE — TELEPHONE ENCOUNTER
"Have drainage from incision , has changed his dressing 3 times    Reason for Disposition  • Sounds like a serious complication to the triager    Additional Information  • Negative: [1] Major abdominal surgical incision AND [2] wound gaping open AND [3] visible internal organs  • Negative: Sounds like a life-threatening emergency to the triager  • Negative: [1] Bleeding from incision AND [2] won't stop after 10 minutes of direct pressure  • Negative: [1] Widespread rash AND [2] bright red, sunburn-like  • Negative: Severe pain in the incision  • Negative: [1] Suture came out early AND [2] wound gaping AND [3] < 48 hours since sutures placed  • Negative: [1] Incision gaping open AND [2] length of opening > 2 inches (5 cm)  • Negative: Patient sounds very sick or weak to the triager    Answer Assessment - Initial Assessment Questions  1. SYMPTOM: \"What's the main symptom you're concerned about?\" (e.g., redness, pain, drainage)      Patient is having drainage which serous sangious and has changed dressing x3 from his back surgery  2. ONSET: \"When did ________  start?\"      Notice today  3. SURGERY: \"What surgery was performed?\"      Lumbar fusion   4. DATE of SURGERY: \"When was surgery performed?\"       11/18  5. INCISION SITE: \"Where is the incision located?\"       back  6. REDNESS: \"Is there any redness at the incision site?\" If yes, ask: \"How wide across is the redness?\" (Inches, centimeters)       na  7. PAIN: \"Is there any pain?\" If so, ask: \"How bad is it?\"  (Scale 1-10; or mild, moderate, severe)      no  8. BLEEDING: \"Is there any bleeding?\" If so, ask: \"How much?\" and \"Where?\"      no  9. DRAINAGE: \"Is there any drainage from the incision site?\" If yes, ask: \"What color and how much?\" (e.g., red, cloudy, pus; drops, teaspoon)      Sero sanguious  10. FEVER: \"Do you have a fever?\" If so, ask: \"What is your temperature, how was it measured, and when did it start?\"        Feels warm  11. OTHER SYMPTOMS: \"Do you " "have any other symptoms?\" (e.g., shaking chills, weakness, rash elsewhere on body)        nauseated    Protocols used: POST-OP INCISION SYMPTOMS-ADULT-AH      "

## 2019-11-25 ENCOUNTER — OFFICE VISIT (OUTPATIENT)
Dept: NEUROSURGERY | Facility: CLINIC | Age: 69
End: 2019-11-25

## 2019-11-25 ENCOUNTER — TELEPHONE (OUTPATIENT)
Dept: NEUROSURGERY | Facility: CLINIC | Age: 69
End: 2019-11-25

## 2019-11-25 VITALS
TEMPERATURE: 98.8 F | BODY MASS INDEX: 31.1 KG/M2 | SYSTOLIC BLOOD PRESSURE: 122 MMHG | DIASTOLIC BLOOD PRESSURE: 70 MMHG | HEIGHT: 69 IN | WEIGHT: 210 LBS

## 2019-11-25 DIAGNOSIS — M43.16 SPONDYLOLISTHESIS OF LUMBAR REGION: Primary | ICD-10-CM

## 2019-11-25 DIAGNOSIS — M48.062 LUMBAR STENOSIS WITH NEUROGENIC CLAUDICATION: ICD-10-CM

## 2019-11-25 PROCEDURE — 99024 POSTOP FOLLOW-UP VISIT: CPT | Performed by: PHYSICIAN ASSISTANT

## 2019-11-25 NOTE — PROGRESS NOTES
Patient: Johnnie Pablo  : 1950  Gender: male    Primary Care Provider: Gallito Root MD      Chief Complaint:   Chief Complaint   Patient presents with   • Post-op       History of Present Illness:  Johnnie Pablo is a 69-year-old gentleman who presented to Dr. Santa's clinic with a protracted history of back and lower extremity symptoms including neurogenic claudication.  Preoperative studies revealed spondylolisthesis at L4-5 with evidence of instability and significant stenosis.  As such he underwent decompression, fusion and stabilization at L4-5 on 2019.  No intraoperative complications were noted.  He was discharged on the second postoperative day in satisfactory condition.    Mr. Pablo presents today for a wound check.  He states that the second day from discharge he noticed bloody drainage from the incision site.  He contacted the nurse line over the weekend and was instructed to present to the ED.  At this time the wound was dressed with a pressure relief dressing and patient was discharged.  He denies fever, chills or other flulike symptoms.  Overall patient states that he feels great with excellent relief of back and lower extremity pain.  He is utilizing ibuprofen for pain relief.  He has no other complaints at this time.      Past Medical and Surgical History:  Past Medical History:   Diagnosis Date   • Arthritis    • Back pain     left side worse   • Cancer (CMS/HCC)     Skin   • GERD (gastroesophageal reflux disease)    • Skin irritation     see dermatology uses cream     Past Surgical History:   Procedure Laterality Date   • COLONOSCOPY     • GALLBLADDER SURGERY     • HEMORRHOIDECTOMY     • JOINT REPLACEMENT      bilateral partial   • KNEE ARTHROPLASTY, PARTIAL REPLACEMENT Bilateral    • LUMBAR LAMINECTOMY WITH FUSION N/A 2019    Procedure: LUMBAR FUSION DECOMPRESSON WITH PEDICLE SCREWS L4-5;  Surgeon: Farhat Santa MD;  Location: [facility];  Service:  Neurosurgery   • SKIN CANCER EXCISION      all over per patient       Current Medications:    Current Outpatient Medications:   •  famotidine (PEPCID) 20 MG tablet, Take 20 mg by mouth 2 (Two) Times a Day As Needed for Heartburn., Disp: , Rfl:   •  fluocinonide (LIDEX) 0.05 % cream, Apply 1 application topically to the appropriate area as directed 2 (Two) Times a Day., Disp: , Rfl:   •  ibuprofen (ADVIL,MOTRIN) 600 MG tablet, Take 1 tablet by mouth 3 (Three) Times a Day., Disp: 45 tablet, Rfl: 0  •  nicotine (NICODERM CQ) 14 MG/24HR patch, Place 1 patch on the skin as directed by provider Daily., Disp: 21 patch, Rfl: 1  •  oxyCODONE ER (oxyCONTIN) 15 MG tablet extended-release 12 hour, Take 1 tablet by mouth Every 12 (Twelve) Hours., Disp: 8 tablet, Rfl: 0  •  oxyCODONE-acetaminophen (PERCOCET) 5-325 MG per tablet, Take 1 tablet by mouth 3 (Three) Times a Day As Needed (Pain). As needed for breakthrough pain, Disp: 30 tablet, Rfl: 0    Allergies:  No Known Allergies      Review of Systems   Constitutional: Negative for activity change, appetite change, chills, diaphoresis, fatigue, fever, unexpected weight gain and unexpected weight loss.   HENT: Negative for congestion, dental problem, drooling, ear discharge, ear pain, facial swelling, hearing loss, mouth sores, nosebleeds, postnasal drip, rhinorrhea, sinus pressure, sneezing, sore throat, swollen glands, tinnitus, trouble swallowing and voice change.    Eyes: Negative for blurred vision, double vision, photophobia, pain, discharge, redness, itching and visual disturbance.   Respiratory: Negative for apnea, cough, choking, chest tightness, shortness of breath, wheezing and stridor.    Cardiovascular: Negative for chest pain, palpitations and leg swelling.   Gastrointestinal: Negative for abdominal distention, abdominal pain, anal bleeding, blood in stool, constipation, diarrhea, nausea, rectal pain, vomiting, GERD and indigestion.   Endocrine: Negative for cold  "intolerance, heat intolerance, polydipsia, polyphagia and polyuria.   Genitourinary: Negative for breast discharge, decreased libido, decreased urine volume, difficulty urinating, dysuria, flank pain, frequency, genital sores, hematuria, urgency and urinary incontinence.   Musculoskeletal: Negative for arthralgias, back pain, gait problem, joint swelling, myalgias, neck pain, neck stiffness and bursitis.   Skin: Negative for color change, dry skin, pallor, rash, skin lesions and bruise.   Allergic/Immunologic: Negative for environmental allergies, food allergies and immunocompromised state.   Neurological: Negative for dizziness, tremors, seizures, syncope, facial asymmetry, speech difficulty, weakness, light-headedness, numbness, headache, memory problem and confusion.   Hematological: Negative for adenopathy. Does not bruise/bleed easily.   Psychiatric/Behavioral: Negative for agitation, behavioral problems, decreased concentration, dysphoric mood, hallucinations, self-injury, sleep disturbance, suicidal ideas, negative for hyperactivity, depressed mood and stress. The patient is not nervous/anxious.    All other systems reviewed and are negative.        Physical Exam  AXOX3  Lumbar incision is well intact with minor separation of the wound edges.  No areas of dehiscence noted. Upon palpation of the surrounding tissue there was serosanguineous drainage noted from lower pole of the incision.  There is an area of mild dense swelling to the superior portion of the incision.  This area is not fluctuant.  The area is mildly tender to palpation.  There is no erythema or purulent drainage noted.      Vitals:    11/25/19 1455   BP: 122/70   BP Location: Right arm   Patient Position: Sitting   Temp: 98.8 °F (37.1 °C)   TempSrc: Temporal   Weight: 95.3 kg (210 lb)   Height: 175.3 cm (69\")           Assessment:  Status post lumbar fusion L4-5  Lumbar wound drainage    Plan:  Mr. Pablo's lumbar incision was evaluated by " myself and Becca Almaraz.  We have placed new Steri-Strips upon the wound.  We have also placed a pressure dressing on the wound and instructed wife on how to do this moving forward.  General restrictions were discussed with patient.  He will keep his current follow-up visit in a couple of weeks.  Patient and wife were instructed to contact our office if the wound continues to drain, or he develops fever or flulike symptoms.        Astrid Loza PA-C

## 2019-12-11 ENCOUNTER — HOSPITAL ENCOUNTER (OUTPATIENT)
Dept: GENERAL RADIOLOGY | Facility: HOSPITAL | Age: 69
Discharge: HOME | End: 2019-12-11
Admitting: NEUROLOGICAL SURGERY

## 2019-12-11 ENCOUNTER — OFFICE VISIT (OUTPATIENT)
Dept: NEUROSURGERY | Facility: CLINIC | Age: 69
End: 2019-12-11

## 2019-12-11 VITALS — TEMPERATURE: 98.4 F | BODY MASS INDEX: 32.88 KG/M2 | WEIGHT: 222 LBS | HEIGHT: 69 IN

## 2019-12-11 DIAGNOSIS — Z98.1 S/P LUMBAR FUSION: ICD-10-CM

## 2019-12-11 DIAGNOSIS — M43.16 SPONDYLOLISTHESIS OF LUMBAR REGION: Primary | ICD-10-CM

## 2019-12-11 DIAGNOSIS — M51.36 DDD (DEGENERATIVE DISC DISEASE), LUMBAR: ICD-10-CM

## 2019-12-11 DIAGNOSIS — Z72.0 TOBACCO ABUSE: ICD-10-CM

## 2019-12-11 DIAGNOSIS — M48.062 LUMBAR STENOSIS WITH NEUROGENIC CLAUDICATION: ICD-10-CM

## 2019-12-11 PROCEDURE — 99024 POSTOP FOLLOW-UP VISIT: CPT | Performed by: PHYSICIAN ASSISTANT

## 2019-12-11 PROCEDURE — 72100 X-RAY EXAM L-S SPINE 2/3 VWS: CPT

## 2019-12-11 NOTE — PROGRESS NOTES
Patient: Johnnie Pablo  : 1950  Chart #: [id number]    Date of Service: 2019    CHIEF COMPLAINT: L4-5 stenosis and spondylolisthesis with instability    History of Present Illness Mr. Pablo is a 69-year-old gentleman with a protracted and progressive course of back and bilateral lower extremity pain with walking and standing intolerance.  His studies demonstrated spondylolisthesis at L4-5 with evidence of instability and significant stenosis.  As such on 2019 he underwent an uncomplicated PLIF at this level.    Patient is 3 weeks post op and doing very well. He has been walking daily and even doing grocery shopping.  His leg pain is completely gone.  His only complaint is some pain in the low back below the level of the incision.  His back pain is typically worse at night.  He has been very active- he has returned to all normal activities already.  He may be overdoing it a bit and I made him aware.  He was seen a week after surgery due to some incisional drainage.  That cleared up a few days following his appointment and has not recurred.  He denies headache or fevers.    Review of Systems   Constitutional: Negative for activity change, appetite change, chills, diaphoresis, fatigue, fever and unexpected weight change.   HENT: Negative for congestion, dental problem, drooling, ear discharge, ear pain, facial swelling, hearing loss, mouth sores, nosebleeds, postnasal drip, rhinorrhea, sinus pressure, sinus pain, sneezing, sore throat, tinnitus, trouble swallowing and voice change.    Eyes: Negative for photophobia, pain, discharge, redness, itching and visual disturbance.   Respiratory: Negative for apnea, cough, choking, chest tightness, shortness of breath, wheezing and stridor.    Cardiovascular: Negative for chest pain, palpitations and leg swelling.   Gastrointestinal: Negative for abdominal distention, abdominal pain, anal bleeding, blood in stool, constipation, diarrhea, nausea,  "rectal pain and vomiting.   Endocrine: Negative for cold intolerance, heat intolerance, polydipsia, polyphagia and polyuria.   Genitourinary: Negative for decreased urine volume, difficulty urinating, discharge, dysuria, enuresis, flank pain, frequency, genital sores, hematuria, penile pain, penile swelling, scrotal swelling, testicular pain and urgency.   Musculoskeletal: Positive for back pain. Negative for arthralgias, gait problem, joint swelling, myalgias, neck pain and neck stiffness.   Skin: Negative for color change, pallor, rash and wound.   Allergic/Immunologic: Negative for environmental allergies, food allergies and immunocompromised state.   Neurological: Negative for dizziness, tremors, seizures, syncope, facial asymmetry, speech difficulty, weakness, light-headedness, numbness and headaches.   Hematological: Negative for adenopathy. Does not bruise/bleed easily.   Psychiatric/Behavioral: Negative for agitation, behavioral problems, confusion, decreased concentration, dysphoric mood, hallucinations, self-injury, sleep disturbance and suicidal ideas. The patient is not nervous/anxious and is not hyperactive.        Objective   Vital Signs: Temperature 98.4 °F (36.9 °C), temperature source Temporal, height 175.3 cm (69\"), weight 101 kg (222 lb).  Physical Exam   Constitutional: He appears well-developed and well-nourished. No distress.   HENT:   Head: Normocephalic and atraumatic.   Eyes: Pupils are equal, round, and reactive to light. EOM are normal.   Skin:   Lumbar incision is intact without evidence of dehiscence or drainage. Non tender, no erythema.  Slight swelling at superior pole    Psychiatric: He has a normal mood and affect. His behavior is normal. Thought content normal.   Nursing note and vitals reviewed.        Assessment/Plan    Diagnosis: Lumbar stenosis, spondylolisthesis and instability status post PLIF L4-5    Medical Decision Making: Mr. Pablo is doing very well. His leg pain and " weakness is completely gone. Unfortunately he does continue to smoke heavily and is not interested in stopping; however, he has cut back dramatically. I counseled him on the implications of continuing as it sets him up for hardware failure and overall poor surgical outcome.  He plans to return to work January 6.  He does a fair amount of driving so he was encouraged to take breaks to walk/stretch.  I sent him for xrays today.  Follow up with Dr. Santa in 6 weeks.         Johnnie was seen today for post-op.    Diagnoses and all orders for this visit:    Spondylolisthesis of lumbar region    Lumbar stenosis with neurogenic claudication    DDD (degenerative disc disease), lumbar    S/P lumbar fusion    Tobacco abuse               Ursula Sepulveda PA-C  Patient Care Team:  Gallito Root MD as PCP - General (General Practice)  Gallito Root MD as Referring Physician (Internal Medicine)

## 2020-01-22 ENCOUNTER — OFFICE VISIT (OUTPATIENT)
Dept: NEUROSURGERY | Facility: CLINIC | Age: 70
End: 2020-01-22

## 2020-01-22 VITALS — WEIGHT: 220 LBS | BODY MASS INDEX: 32.58 KG/M2 | HEIGHT: 69 IN | TEMPERATURE: 98.3 F

## 2020-01-22 DIAGNOSIS — Z98.1 S/P LUMBAR FUSION: Primary | ICD-10-CM

## 2020-01-22 DIAGNOSIS — M48.062 SPINAL STENOSIS, LUMBAR REGION, WITH NEUROGENIC CLAUDICATION: ICD-10-CM

## 2020-01-22 DIAGNOSIS — M43.16 SPONDYLOLISTHESIS OF LUMBAR REGION: ICD-10-CM

## 2020-01-22 PROCEDURE — 99024 POSTOP FOLLOW-UP VISIT: CPT | Performed by: NEUROLOGICAL SURGERY

## 2020-01-22 NOTE — PROGRESS NOTES
Patient: Johnnie Pablo  : 1950    Primary Care Provider: Gallito Root MD    Requesting Provider: As above        History    Chief Complaint: L4-5 stenosis with spondylolisthesis and instability.    History of Present Illness: Mr. Pablo a 69-year-old gentleman who presented with back and bilateral lower extremity pain with neurogenic claudication.  On 2019 he underwent uncomplicated PLIF.  His claudication symptoms are absent.  He has some very low-grade back pain he continues to smoke intermittently.    Review of Systems   Constitutional: Negative for activity change, appetite change, chills, diaphoresis, fatigue, fever and unexpected weight change.   HENT: Negative for congestion, dental problem, drooling, ear discharge, ear pain, facial swelling, hearing loss, mouth sores, nosebleeds, postnasal drip, rhinorrhea, sinus pressure, sneezing, sore throat, tinnitus, trouble swallowing and voice change.    Eyes: Negative for photophobia, pain, discharge, redness, itching and visual disturbance.   Respiratory: Negative for apnea, cough, choking, chest tightness, shortness of breath, wheezing and stridor.    Cardiovascular: Negative for chest pain, palpitations and leg swelling.   Gastrointestinal: Negative for abdominal distention, abdominal pain, anal bleeding, blood in stool, constipation, diarrhea, nausea, rectal pain and vomiting.   Endocrine: Negative for cold intolerance, heat intolerance, polydipsia, polyphagia and polyuria.   Genitourinary: Negative for decreased urine volume, difficulty urinating, dysuria, enuresis, flank pain, frequency, genital sores, hematuria and urgency.   Musculoskeletal: Negative for arthralgias, back pain, gait problem, joint swelling, myalgias, neck pain and neck stiffness.   Skin: Negative for color change, pallor, rash and wound.   Allergic/Immunologic: Negative for environmental allergies, food allergies and immunocompromised state.   Neurological:  "Negative for dizziness, tremors, seizures, syncope, facial asymmetry, speech difficulty, weakness, light-headedness, numbness and headaches.   Hematological: Negative for adenopathy. Does not bruise/bleed easily.   Psychiatric/Behavioral: Negative for agitation, behavioral problems, confusion, decreased concentration, dysphoric mood, hallucinations, self-injury, sleep disturbance and suicidal ideas. The patient is not nervous/anxious and is not hyperactive.        The patient's past medical history, past surgical history, family history, and social history have been reviewed at length in the electronic medical record.    Physical Exam:   Temp 98.3 °F (36.8 °C) (Temporal)   Ht 175.3 cm (69\")   Wt 99.8 kg (220 lb)   BMI 32.49 kg/m²   Incision looks great.  He moves about in a spry fashion.    Medical Decision Making    Diagnosis:   L4-5 spondylolisthesis, stenosis, and instability status post decompression, fusion, stabilization.    Treatment Options:   Mr. Pablo is doing quite well.  He will steadily increase his activities.  I have had a lengthy discussion about tobacco use and the downside of pseudoarthrosis.  He will follow-up in our clinic in 3 months with new plain films of the lumbar spine.       Diagnosis Plan   1. S/P lumbar fusion  XR Spine Lumbar 2 or 3 View   2. Spinal stenosis, lumbar region, with neurogenic claudication     3. Spondylolisthesis of lumbar region           Scribed for Farhat Santa MD by Denia Unger CMA on 01/22/2020 at 8:00 AM    I, Dr. Santa, personally performed the services described in the documentation, as scribed in my presence, and it is both accurate and complete.  "

## 2020-05-18 ENCOUNTER — HOSPITAL ENCOUNTER (OUTPATIENT)
Dept: GENERAL RADIOLOGY | Facility: HOSPITAL | Age: 70
Discharge: HOME | End: 2020-05-18
Admitting: NEUROLOGICAL SURGERY

## 2020-05-18 DIAGNOSIS — Z98.1 S/P LUMBAR FUSION: ICD-10-CM

## 2020-05-18 PROCEDURE — 72100 X-RAY EXAM L-S SPINE 2/3 VWS: CPT

## 2020-05-19 ENCOUNTER — TELEPHONE (OUTPATIENT)
Dept: NEUROSURGERY | Facility: CLINIC | Age: 70
End: 2020-05-19

## 2020-05-19 NOTE — TELEPHONE ENCOUNTER
PT RETURNED MY CALL ABOUT RESCHEDULING HIS APPT TOMORROW WITH DR. MULLIGAN TO HIS PA AND PT REFUSED FOR THE 2ND TIME AND WANTS TO SEE DR. MULLIGAN. PT WANTS TO SEE DR. MULLIGAN BECAUSE OF HIS INTERMITTENT PARTIAL PARALYZING THAT HAS HAPPENED TWICE. HE SAYS HIS LEGS FEEL LIKE THEY GO DEAD (HE LOSES ALL FEELING IN HIS LEGS)  AND HE CAN'T WALK. 1ST TIME IT HAPPENED HE FELL THE SECOND TIME HE KNEW IT WAS COMING AND SAT DOWN.  PLEASE ADVISE ON SCHEDULING! I LEFT SCHEDULE AS IS FOR NOW.    PT CONTACT# [phone]  BEST TIME TO CALL: ANYTIME

## 2020-05-19 NOTE — TELEPHONE ENCOUNTER
Per Dr. Santa- he is fine seeing pt in office but it will be next week, or week after at the earliest.

## 2020-06-03 ENCOUNTER — OFFICE VISIT (OUTPATIENT)
Dept: NEUROSURGERY | Facility: CLINIC | Age: 70
End: 2020-06-03

## 2020-06-03 VITALS — WEIGHT: 225.6 LBS | TEMPERATURE: 98 F | RESPIRATION RATE: 16 BRPM | BODY MASS INDEX: 33.41 KG/M2 | HEIGHT: 69 IN

## 2020-06-03 DIAGNOSIS — M48.062 SPINAL STENOSIS, LUMBAR REGION, WITH NEUROGENIC CLAUDICATION: ICD-10-CM

## 2020-06-03 DIAGNOSIS — Z98.1 S/P LUMBAR FUSION: Primary | ICD-10-CM

## 2020-06-03 DIAGNOSIS — M43.16 SPONDYLOLISTHESIS OF LUMBAR REGION: ICD-10-CM

## 2020-06-03 PROCEDURE — 99213 OFFICE O/P EST LOW 20 MIN: CPT | Performed by: NEUROLOGICAL SURGERY

## 2020-06-03 NOTE — PROGRESS NOTES
Patient: Johnnie Pablo  : 1950  Chart #: [id number]    Date of Service: 2020    CHIEF COMPLAINT: []    History of Present Illness       Past Medical History:   Diagnosis Date   • Arthritis    • Back pain     left side worse   • Cancer (CMS/HCC)     Skin   • GERD (gastroesophageal reflux disease)    • Skin irritation     see dermatology uses cream         Current Outpatient Medications:   •  famotidine (PEPCID) 20 MG tablet, Take 20 mg by mouth 2 (Two) Times a Day As Needed for Heartburn., Disp: , Rfl:   •  fluocinonide (LIDEX) 0.05 % cream, Apply 1 application topically to the appropriate area as directed 2 (Two) Times a Day., Disp: , Rfl:   •  ibuprofen (ADVIL,MOTRIN) 600 MG tablet, Take 1 tablet by mouth 3 (Three) Times a Day., Disp: 45 tablet, Rfl: 0  •  nicotine (NICODERM CQ) 14 MG/24HR patch, Place 1 patch on the skin as directed by provider Daily., Disp: 21 patch, Rfl: 1  •  oxyCODONE ER (oxyCONTIN) 15 MG tablet extended-release 12 hour, Take 1 tablet by mouth Every 12 (Twelve) Hours., Disp: 8 tablet, Rfl: 0  •  oxyCODONE-acetaminophen (PERCOCET) 5-325 MG per tablet, Take 1 tablet by mouth 3 (Three) Times a Day As Needed (Pain). As needed for breakthrough pain, Disp: 30 tablet, Rfl: 0    Past Surgical History:   Procedure Laterality Date   • COLONOSCOPY     • GALLBLADDER SURGERY     • HEMORRHOIDECTOMY     • JOINT REPLACEMENT      bilateral partial   • KNEE ARTHROPLASTY, PARTIAL REPLACEMENT Bilateral    • LUMBAR LAMINECTOMY WITH FUSION N/A 2019    Procedure: LUMBAR FUSION DECOMPRESSON WITH PEDICLE SCREWS L4-5;  Surgeon: Farhat Santa MD;  [facility];  Service: Neurosurgery   • SKIN CANCER EXCISION      all over per patient       Social History     Socioeconomic History   • Marital status:      Spouse name: Not on file   • Number of children: Not on file   • Years of education: Not on file   • Highest education level: Not on file   Tobacco Use   • Smoking status:  Current Every Day Smoker     Packs/day: 0.50     Types: Cigarettes, Electronic Cigarette   • Smokeless tobacco: Never Used   Substance and Sexual Activity   • Alcohol use: Yes     Comment: occ   • Drug use: No   • Sexual activity: Defer         Review of Systems   Constitutional: Negative for activity change, appetite change, chills, diaphoresis, fatigue, fever and unexpected weight change.   HENT: Negative for congestion, dental problem, drooling, ear discharge, ear pain, facial swelling, hearing loss, mouth sores, nosebleeds, postnasal drip, rhinorrhea, sinus pressure, sneezing, sore throat, tinnitus, trouble swallowing and voice change.    Eyes: Negative for photophobia, pain, discharge, redness, itching and visual disturbance.   Respiratory: Negative for apnea, cough, choking, chest tightness, shortness of breath, wheezing and stridor.    Cardiovascular: Negative for chest pain, palpitations and leg swelling.   Gastrointestinal: Negative for abdominal distention, abdominal pain, anal bleeding, blood in stool, constipation, diarrhea, nausea, rectal pain and vomiting.   Endocrine: Negative for cold intolerance, heat intolerance, polydipsia, polyphagia and polyuria.   Genitourinary: Negative for decreased urine volume, difficulty urinating, dysuria, enuresis, flank pain, frequency, genital sores, hematuria and urgency.   Musculoskeletal: Positive for back pain. Negative for arthralgias, gait problem, joint swelling, myalgias, neck pain and neck stiffness.   Skin: Negative for color change, pallor, rash and wound.   Allergic/Immunologic: Negative for environmental allergies, food allergies and immunocompromised state.   Neurological: Negative for dizziness, tremors, seizures, syncope, facial asymmetry, speech difficulty, weakness, light-headedness, numbness and headaches.   Hematological: Negative for adenopathy. Does not bruise/bleed easily.   Psychiatric/Behavioral: Negative for agitation, behavioral problems,  "confusion, decreased concentration, dysphoric mood, hallucinations, self-injury, sleep disturbance and suicidal ideas. The patient is not nervous/anxious and is not hyperactive.    All other systems reviewed and are negative.      Objective   Vital Signs: Temperature 98 °F (36.7 °C), temperature source Temporal, resp. rate 16, height 175.3 cm (69\"), weight 102 kg (225 lb 9.6 oz).  Physical Exam  Musculoskeletal:  Strength is intact in upper and lower extremities to direct testing.  Station and gait are normal.  Straight leg raising is negative.   Neurologic:  Muscle tone is normal throughout.  Coordination is intact.  Deep tendon reflexes: 2+ and symmetrical.  Sensation is intact to light touch throughout.  Patient is oriented to person, place, and time.    Independent review of radiographic imaging: []    Assessment/Plan   Diagnosis:    Medical Decision Making: []    There are no diagnoses linked to this encounter.           Angy Orellana MA  Patient Care Team:  Gallito Root MD as PCP - General (General Practice)  Gallito Root MD as Referring Physician (Internal Medicine)              "

## 2020-06-03 NOTE — PROGRESS NOTES
Patient: Johnnie Pablo  : 1950    Primary Care Provider: Gallito Root MD    Requesting Provider: As above        History    Chief Complaint: L4-5 spinal stenosis with spondylolisthesis    History of Present Illness: Mr. Pablo a 69-year-old gentleman who presented with back and bilateral lower extremity pain with neurogenic claudication.  On 2019 he underwent uncomplicated PLIF.  His claudication symptoms are absent.  He has had stiffness in his back that resolves after he is up and moving.  He has had 2 several minute spells of weakness and numbness globally in his legs.  That has not recurred.  He chronically has some urinary leakage but that has not changed.  He has been playing golf.  He has been fairly active.  He continues to smoke.    Review of Systems   Constitutional: Negative for activity change, appetite change, chills, diaphoresis, fatigue, fever and unexpected weight change.   HENT: Negative for congestion, dental problem, drooling, ear discharge, ear pain, facial swelling, hearing loss, mouth sores, nosebleeds, postnasal drip, rhinorrhea, sinus pressure, sneezing, sore throat, tinnitus, trouble swallowing and voice change.    Eyes: Negative for photophobia, pain, discharge, redness, itching and visual disturbance.   Respiratory: Negative for apnea, cough, choking, chest tightness, shortness of breath, wheezing and stridor.    Cardiovascular: Negative for chest pain, palpitations and leg swelling.   Gastrointestinal: Negative for abdominal distention, abdominal pain, anal bleeding, blood in stool, constipation, diarrhea, nausea, rectal pain and vomiting.   Endocrine: Negative for cold intolerance, heat intolerance, polydipsia, polyphagia and polyuria.   Genitourinary: Negative for decreased urine volume, difficulty urinating, dysuria, enuresis, flank pain, frequency, genital sores, hematuria and urgency.   Musculoskeletal: Negative for arthralgias, back pain, gait  "problem, joint swelling, myalgias, neck pain and neck stiffness.   Skin: Negative for color change, pallor, rash and wound.   Allergic/Immunologic: Negative for environmental allergies, food allergies and immunocompromised state.   Neurological: Negative for dizziness, tremors, seizures, syncope, facial asymmetry, speech difficulty, weakness, light-headedness, numbness and headaches.   Hematological: Negative for adenopathy. Does not bruise/bleed easily.   Psychiatric/Behavioral: Negative for agitation, behavioral problems, confusion, decreased concentration, dysphoric mood, hallucinations, self-injury, sleep disturbance and suicidal ideas. The patient is not nervous/anxious and is not hyperactive.        The patient's past medical history, past surgical history, family history, and social history have been reviewed at length in the electronic medical record.    Physical Exam:   Temp 98 °F (36.7 °C) (Temporal)   Resp 16   Ht 175.3 cm (69\")   Wt 102 kg (225 lb 9.6 oz)   BMI 33.32 kg/m²   MUSCULOSKELETAL:  Straight leg raising is negative.  Rosales's Sign is negative.  Tenderness in the back to palpation is not observed.  NEUROLOGICAL:  Strength is intact in the lower extremities to direct testing.  Muscle tone is normal throughout.  Station and gait are normal.  Sensation is intact to light touch testing throughout.  Deep tendon reflexes are occult to elicit throughout.  Coordination is intact.      Medical Decision Making    Data Review:   Plain films of his lumbar spine show excellent positioning of his L4-5 construct.  There is some modest fusion mass forming the interbody.    Diagnosis:   1.  L4-5 spondylolisthesis with instability status post decompression, fusion, stabilization.  2.  Tobacco abuse.    Treatment Options:   I have once again emphasized the importance of smoking cessation as it relates to a successful fusion.  He will follow-up with me in 5 months with new plain films of the lumbar spine.  If " he develops further difficulties in the interim he will contact my office.       Diagnosis Plan   1. S/P lumbar fusion  XR Spine Lumbar 2 or 3 View   2. Spinal stenosis, lumbar region, with neurogenic claudication     3. Spondylolisthesis of lumbar region         Scribed for Farhat Santa MD by Denia Unger CMA on 06/03/2020 at 10:30 AM      I, Dr. Santa, personally performed the services described in the documentation, as scribed in my presence, and it is both accurate and complete.

## 2020-07-16 DIAGNOSIS — R00.2 PALPITATIONS: Primary | ICD-10-CM

## 2022-02-23 ENCOUNTER — NURSE TRIAGE (OUTPATIENT)
Dept: CALL CENTER | Facility: HOSPITAL | Age: 72
End: 2022-02-23

## 2022-02-23 ENCOUNTER — TRANSCRIBE ORDERS (OUTPATIENT)
Dept: ADMINISTRATIVE | Facility: HOSPITAL | Age: 72
End: 2022-02-23

## 2022-02-23 DIAGNOSIS — M51.36 DEGENERATION OF INTERVERTEBRAL DISC OF LUMBAR REGION: Primary | ICD-10-CM

## 2022-02-23 NOTE — TELEPHONE ENCOUNTER
"    Reason for Disposition  • [1] Follow-up call to recent contact AND [2] information only call, no triage required    Additional Information  • Negative: [1] Caller is not with the adult (patient) AND [2] reporting urgent symptoms  • Negative: Lab result questions  • Negative: Medication questions  • Negative: Caller can't be reached by phone  • Negative: Caller has already spoken to PCP or another triager  • Negative: RN needs further essential information from caller in order to complete triage  • Negative: Requesting regular office appointment  • Negative: [1] Caller requesting NON-URGENT health information AND [2] PCP's office is the best resource  • Negative: Health Information question, no triage required and triager able to answer question  • Negative: General information question, no triage required and triager able to answer question  • Negative: Question about upcoming scheduled test, no triage required and triager able to answer question  • Negative: [1] Caller is not with the adult (patient) AND [2] probable NON-URGENT symptoms    Answer Assessment - Initial Assessment Questions  1. REASON FOR CALL or QUESTION: \"What is your reason for calling today?\" or \"How can I best help you?\" or \"What question do you have that I can help answer?\"      When is MRI Scheduled    Protocols used: INFORMATION ONLY CALL - NO TRIAGE-ADULT-      "

## 2022-03-17 ENCOUNTER — APPOINTMENT (OUTPATIENT)
Dept: MRI IMAGING | Facility: HOSPITAL | Age: 72
End: 2022-03-17

## 2022-03-25 ENCOUNTER — APPOINTMENT (OUTPATIENT)
Dept: MRI IMAGING | Facility: HOSPITAL | Age: 72
End: 2022-03-25

## 2023-03-02 ENCOUNTER — TRANSCRIBE ORDERS (OUTPATIENT)
Dept: ADMINISTRATIVE | Facility: HOSPITAL | Age: 73
End: 2023-03-02
Payer: MEDICARE

## 2023-03-02 DIAGNOSIS — G45.9 TRANSIENT CEREBRAL ISCHEMIA, UNSPECIFIED TYPE: Primary | ICD-10-CM

## 2023-03-14 ENCOUNTER — TRANSCRIBE ORDERS (OUTPATIENT)
Dept: ADMINISTRATIVE | Facility: HOSPITAL | Age: 73
End: 2023-03-14
Payer: MEDICARE

## 2023-03-14 DIAGNOSIS — G45.3 AMAUROSIS FUGAX: Primary | ICD-10-CM

## 2023-03-15 ENCOUNTER — HOSPITAL ENCOUNTER (OUTPATIENT)
Dept: CARDIOLOGY | Facility: HOSPITAL | Age: 73
Discharge: HOME | End: 2023-03-15
Admitting: INTERNAL MEDICINE
Payer: MEDICARE

## 2023-03-15 VITALS — BODY MASS INDEX: 33.47 KG/M2 | WEIGHT: 226 LBS | HEIGHT: 69 IN

## 2023-03-15 DIAGNOSIS — G45.3 AMAUROSIS FUGAX: ICD-10-CM

## 2023-03-15 LAB
BH CV XLRA MEAS LEFT DIST CCA EDV: 30.5 CM/SEC
BH CV XLRA MEAS LEFT DIST CCA PSV: 90.9 CM/SEC
BH CV XLRA MEAS LEFT ICA/CCA RATIO: 0.8
BH CV XLRA MEAS LEFT MID CCA EDV: 33.8 CM/SEC
BH CV XLRA MEAS LEFT MID CCA PSV: 101 CM/SEC
BH CV XLRA MEAS LEFT MID ICA EDV: 31.7 CM/SEC
BH CV XLRA MEAS LEFT MID ICA PSV: 80.9 CM/SEC
BH CV XLRA MEAS LEFT PROX CCA EDV: 32.2 CM/SEC
BH CV XLRA MEAS LEFT PROX CCA PSV: 105 CM/SEC
BH CV XLRA MEAS LEFT PROX ECA EDV: 19.9 CM/SEC
BH CV XLRA MEAS LEFT PROX ECA PSV: 75 CM/SEC
BH CV XLRA MEAS LEFT PROX ICA EDV: 34 CM/SEC
BH CV XLRA MEAS LEFT PROX ICA PSV: 80.3 CM/SEC
BH CV XLRA MEAS LEFT PROX SCLA PSV: 134 CM/SEC
BH CV XLRA MEAS LEFT VERTEBRAL A EDV: 14.5 CM/SEC
BH CV XLRA MEAS LEFT VERTEBRAL A PSV: 44.4 CM/SEC
BH CV XLRA MEAS RIGHT DIST CCA EDV: 33.4 CM/SEC
BH CV XLRA MEAS RIGHT DIST CCA PSV: 104 CM/SEC
BH CV XLRA MEAS RIGHT DIST ICA EDV: 28.7 CM/SEC
BH CV XLRA MEAS RIGHT DIST ICA PSV: 63.6 CM/SEC
BH CV XLRA MEAS RIGHT ICA/CCA RATIO: 0.53
BH CV XLRA MEAS RIGHT MID CCA EDV: 32.2 CM/SEC
BH CV XLRA MEAS RIGHT MID CCA PSV: 112 CM/SEC
BH CV XLRA MEAS RIGHT MID ICA EDV: 24.4 CM/SEC
BH CV XLRA MEAS RIGHT MID ICA PSV: 59.9 CM/SEC
BH CV XLRA MEAS RIGHT PROX CCA EDV: 24 CM/SEC
BH CV XLRA MEAS RIGHT PROX CCA PSV: 98.5 CM/SEC
BH CV XLRA MEAS RIGHT PROX ECA EDV: 27.6 CM/SEC
BH CV XLRA MEAS RIGHT PROX ECA PSV: 93.8 CM/SEC
BH CV XLRA MEAS RIGHT PROX ICA EDV: 14.9 CM/SEC
BH CV XLRA MEAS RIGHT PROX ICA PSV: 51.1 CM/SEC
BH CV XLRA MEAS RIGHT PROX SCLA PSV: 85 CM/SEC
BH CV XLRA MEAS RIGHT VERTEBRAL A EDV: 11.9 CM/SEC
BH CV XLRA MEAS RIGHT VERTEBRAL A PSV: 35.5 CM/SEC
LEFT ARM BP: NORMAL MMHG
MAXIMAL PREDICTED HEART RATE: 148 BPM
RIGHT ARM BP: NORMAL MMHG
STRESS TARGET HR: 126 BPM

## 2023-03-15 PROCEDURE — 93880 EXTRACRANIAL BILAT STUDY: CPT

## 2023-03-15 PROCEDURE — 93880 EXTRACRANIAL BILAT STUDY: CPT | Performed by: INTERNAL MEDICINE

## 2023-04-03 ENCOUNTER — TRANSCRIBE ORDERS (OUTPATIENT)
Dept: ADMINISTRATIVE | Facility: HOSPITAL | Age: 73
End: 2023-04-03
Payer: MEDICARE

## 2023-04-03 DIAGNOSIS — H53.2 DIPLOPIA: Primary | ICD-10-CM

## 2023-04-07 ENCOUNTER — HOSPITAL ENCOUNTER (OUTPATIENT)
Dept: CT IMAGING | Facility: HOSPITAL | Age: 73
Discharge: HOME | End: 2023-04-07
Admitting: INTERNAL MEDICINE
Payer: MEDICARE

## 2023-04-07 DIAGNOSIS — H53.2 DIPLOPIA: ICD-10-CM

## 2023-04-07 LAB — CREAT BLDA-MCNC: 1.3 MG/DL (ref 0.6–1.3)

## 2023-04-07 PROCEDURE — 82565 ASSAY OF CREATININE: CPT

## 2023-04-07 PROCEDURE — 25510000001 IOPAMIDOL PER 1 ML: Performed by: INTERNAL MEDICINE

## 2023-04-07 PROCEDURE — 70482 CT ORBIT/EAR/FOSSA W/O&W/DYE: CPT

## 2023-04-07 PROCEDURE — 70470 CT HEAD/BRAIN W/O & W/DYE: CPT

## 2023-04-07 RX ADMIN — IOPAMIDOL 75 ML: 755 INJECTION, SOLUTION INTRAVENOUS at 15:28

## (undated) DEVICE — SUT VIC 0 CTD BR 18IN UNDYE VCP724D

## (undated) DEVICE — TRAP,MUCUS SPECIMEN,40CC: Brand: MEDLINE

## (undated) DEVICE — Device

## (undated) DEVICE — HOLDER: Brand: DEROYAL

## (undated) DEVICE — ANTIBACTERIAL UNDYED BRAIDED (POLYGLACTIN 910), SYNTHETIC ABSORBABLE SUTURE: Brand: COATED VICRYL

## (undated) DEVICE — KT DRN EVAC WND PVC PCH WTROC RND 10F400

## (undated) DEVICE — ELECTRD BLD EXT EDGE/INSUL 1P 4IN

## (undated) DEVICE — SPHR MARKR STEALTH STATION

## (undated) DEVICE — CVR HNDL LT SURG ACCSSRY BLU STRL

## (undated) DEVICE — SUT VIC PLS CTD ANTIB BR 3/0 8/18IN 45CM

## (undated) DEVICE — SHEET,DRAPE,40X58,STERILE: Brand: MEDLINE

## (undated) DEVICE — PACK,UNIVERSAL,NO GOWNS: Brand: MEDLINE

## (undated) DEVICE — PK NEURO DISC 10

## (undated) DEVICE — SNAP KOVER: Brand: UNBRANDED

## (undated) DEVICE — DRSNG SURESITE123 6X8IN

## (undated) DEVICE — GLV SURG PREMIERPRO MIC LTX PF SZ6.5 BRN

## (undated) DEVICE — DRAPE,TOP,102X53,STERILE: Brand: MEDLINE

## (undated) DEVICE — DRSNG WND GZ PAD BORDERED 4X8IN STRL

## (undated) DEVICE — ACCY PA700 LUBRICANT DIFFUSER MR7 4 PACK: Brand: MIDAS REX

## (undated) DEVICE — IRRIGATOR BULB ASEPTO 60CC STRL

## (undated) DEVICE — 3M™ STERI-STRIP™ REINFORCED ADHESIVE SKIN CLOSURES, R1547, 1/2 IN X 4 IN (12 MM X 100 MM), 6 STRIPS/ENVELOPE: Brand: 3M™ STERI-STRIP™

## (undated) DEVICE — ADHS LIQ MASTISOL 2/3ML

## (undated) DEVICE — GLV SURG PREMIERPRO MIC LTX PF SZ7.5 BRN

## (undated) DEVICE — GOWN,REINF,POLY,ECL,PP SLV,XL: Brand: MEDLINE

## (undated) DEVICE — SUT ETHLN 3/0 FS1 30IN 669H

## (undated) DEVICE — TOOL 14MH30 LEGEND 14CM 3MM: Brand: MIDAS REX ™

## (undated) DEVICE — 3M™ STERI-DRAPE™ INSTRUMENT POUCH 1018: Brand: STERI-DRAPE™

## (undated) DEVICE — 3M™ WARMING BLANKET, UPPER BODY, 10 PER CASE, 42268: Brand: BAIR HUGGER™